# Patient Record
Sex: MALE | Race: WHITE | Employment: OTHER | URBAN - METROPOLITAN AREA
[De-identification: names, ages, dates, MRNs, and addresses within clinical notes are randomized per-mention and may not be internally consistent; named-entity substitution may affect disease eponyms.]

---

## 2017-01-03 ENCOUNTER — NURSING HOME VISIT (OUTPATIENT)
Dept: GERIATRICS | Facility: CLINIC | Age: 82
End: 2017-01-03
Payer: COMMERCIAL

## 2017-01-03 ENCOUNTER — NURSING HOME VISIT (OUTPATIENT)
Dept: GERIATRICS | Facility: CLINIC | Age: 82
End: 2017-01-03

## 2017-01-03 VITALS
TEMPERATURE: 97.4 F | OXYGEN SATURATION: 94 % | HEART RATE: 70 BPM | WEIGHT: 189.2 LBS | RESPIRATION RATE: 16 BRPM | SYSTOLIC BLOOD PRESSURE: 142 MMHG | DIASTOLIC BLOOD PRESSURE: 71 MMHG

## 2017-01-03 VITALS
SYSTOLIC BLOOD PRESSURE: 142 MMHG | OXYGEN SATURATION: 94 % | DIASTOLIC BLOOD PRESSURE: 71 MMHG | RESPIRATION RATE: 16 BRPM | HEART RATE: 70 BPM | WEIGHT: 189.2 LBS | TEMPERATURE: 97.4 F

## 2017-01-03 DIAGNOSIS — Z53.9 ERRONEOUS ENCOUNTER--DISREGARD: Primary | ICD-10-CM

## 2017-01-03 DIAGNOSIS — S06.5XAA SUBDURAL HEMATOMA (H): Primary | ICD-10-CM

## 2017-01-03 DIAGNOSIS — N40.0 BENIGN PROSTATIC HYPERPLASIA WITHOUT LOWER URINARY TRACT SYMPTOMS, UNSPECIFIED MORPHOLOGY: ICD-10-CM

## 2017-01-03 DIAGNOSIS — M1A.09X0 CHRONIC GOUT OF MULTIPLE SITES, UNSPECIFIED CAUSE: ICD-10-CM

## 2017-01-03 DIAGNOSIS — N30.00 ACUTE CYSTITIS WITHOUT HEMATURIA: ICD-10-CM

## 2017-01-03 DIAGNOSIS — R53.81 PHYSICAL DECONDITIONING: ICD-10-CM

## 2017-01-03 DIAGNOSIS — K12.0 CANKER SORE: ICD-10-CM

## 2017-01-03 DIAGNOSIS — E11.9 TYPE 2 DIABETES MELLITUS WITHOUT COMPLICATION, WITHOUT LONG-TERM CURRENT USE OF INSULIN (H): ICD-10-CM

## 2017-01-03 PROCEDURE — 99310 SBSQ NF CARE HIGH MDM 45: CPT | Performed by: NURSE PRACTITIONER

## 2017-01-03 PROCEDURE — 99207 ZZC CDG-CORRECTLY CODED, REVIEWED AND AGREE: CPT | Performed by: NURSE PRACTITIONER

## 2017-01-03 RX ORDER — LEVETIRACETAM 750 MG/1
750 TABLET ORAL 2 TIMES DAILY
COMMUNITY
End: 2017-09-11

## 2017-01-03 RX ORDER — TAMSULOSIN HYDROCHLORIDE 0.4 MG/1
0.4 CAPSULE ORAL DAILY
COMMUNITY

## 2017-01-03 NOTE — PROGRESS NOTES
"[facility]  PRIMARY CARE PROVIDER AND CLINIC:  Confirmed, No Pcp None  Chief Complaint   Patient presents with     Hospital F/U       HPI:    Farhad Roa is a 89 year old  (8/28/1923),[facility] .  Hospital stay 12/29/16 through 12/31/16.  Admitted to this facility for  rehab, medical management and nursing care. Current issues are:        Per Hospital Course:     \"89 year old male with history of DMII, OA, HTN, and hypothyroidism who presents from home with a fall and evidence of subdural hematoma on imaging.    # Acute subdural Hematoma    #Chronic weakness with recurrent falls  Patient presented from home with family for a fall and evidence of subdural hematoma on CT. Neuro exam was normal/stable throughout admission. Neurosurgery was consulted and recommended serial imaging with medical management; repeat imaging showed a stable subdural hematoma. The patient's fall was felt to be mechanical given his description of the event, history of weakness with falls, and lack of other symptoms. Neurosurgery recommended a short course of levetiracetam prior for 1 week as seizure prophylaxis. The patient was evaluated by PT/OT who recommended he be sent to a short term rehab facility prior to returning to assisted living facility.   -Continue levetiracetam for 4 days then discontinue if patient remains stable  -Neurosurgery clinic follow up in 1 month with repeat CT    #Metformin intolerance  During admission the patient's daughter noted the patient has mild stomach cramping after taking metformin on a daily basis. Recent A1c elevated at 9.2. Patient to follow up with PCP for adjustment of diabetes management following rehab.\"      ---------------------------    Today patient is found in room. Alert, lying in bed. Muted affect but does answer questions approp. Slowed responses. Denies pain. Denies n/v or headache. Denies SOB, " chest pain. States appetite is okay. Nsg reports diarrhea x 2 today- of note Metformin just increased (tapering up 2/2 some concern about GI issues). Patient denies abd pain. Reports canker sore right low lip and some associated discomfort. States did meet with therapy today. VS reviewed and stable.     CODE STATUS/ADVANCE DIRECTIVES DISCUSSION:   Reviewed.  Patient's living condition: lives alone    ALLERGIES:Lisinopril and Penicillins  PAST MEDICAL HISTORY:  has no past medical history on file.  PAST SURGICAL HISTORY:  has no past surgical history on file.  FAMILY HISTORY: family history is not on file.  SOCIAL HISTORY:      Post Discharge Medication Reconciliation Status: discharge medications reconciled and changed, per note/orders (see AVS).  Current Outpatient Prescriptions   Medication Sig Dispense Refill     LEVOTHYROXINE SODIUM PO Take 0.025 mg by mouth daily       CIPROFLOXACIN HCL PO Take 500 mg by mouth 2 times daily 1/1/17-1/6/17       tamsulosin (FLOMAX) 0.4 MG capsule Take by mouth daily       ALLOPURINOL PO Take 100 mg by mouth daily       levETIRAcetam (KEPPRA) 750 MG tablet Take 750 mg by mouth 2 times daily 1/1/17-1/5/17       FINASTERIDE PO Take 5 mg by mouth daily       METFORMIN HCL PO Take 500 mg by mouth Give 500 mg by mouth one time a day for DM for 7 Days Slowly titrate up to prevent diarrhea/abd cramping. THEN Give 500 mg by mouth two times a day for DM for 7 Days Slowly titrate up to prevent diarrhea/abd cramping. THEN Give 1000 mg by mouth three times a day for DM for 7 Days Slowly titrate up to prevent diarrhea/abd cramping. THEN Give 1000 mg by mouth two times a day for DM Slowly titrate up to prevent diarrhea/abd cramping.         ROS:  4 point ROS including Respiratory, CV, GI and , other than that noted in the HPI,  is negative    Exam:  /71 mmHg  Pulse 70  Temp(Src) 97.4  F (36.3  C)  Resp 16  Wt 189 lb 3.2 oz (85.821 kg)  SpO2 94%    GENERAL APPEARANCE: Alert, in no  distress   ENT: Mouth and posterior oropharynx normal, moist mucous membranes - Small pale apthous ulcer noted left side lower inner lip  EYES: EOM, conjunctivae, lids, pupils and irises normal   NECK: No adenopathy,masses or thyromegaly   RESP: respiratory effort and palpation of chest normal, Lungs clear to auscultation  CV: Palpation and auscultation of heart done , regular rate and rhythm, no murmur, rub, or gallop, peripheral edema - none noted  ABDOMEN: normal bowel sounds, soft, nontender, no hepatosplenomegaly or other masses   : palpation of bladder WNL   M/S: Gait and station normal   Digits and nails normal - OLIVARES  SKIN: Inspection of skin and subcutaneous tissue baseline, Palpation of skin and subcutaneous tissue baseline,  NEURO: Cranial nerves 2-12 are normal tested and grossly at patient's baseline, Examination of sensation by touch normal   PSYCH: oriented X 3, affect and mood normal               Lab/Diagnostic data:  No current labs on file.      ASSESSMENT/PLAN:  Subdural hematoma (H)  Frequent falls  - serial [facility] inpatient - stable  - follow neuros, continue on short Keppra course for Sz proph  - PT/OT, fall prec  - ST eval/tx cog-ling  - f/w neuro 1 month    Acute cystitis without hematuria  - completing Cipro on 6th  - follow clinically    Chronic gout of multiple sites, unspecified cause   - no active flare  - continue on allopurinol    Type 2 diabetes mellitus without complication, without long-term current use of insulin (H)  - HGB A1c 9.2  - some concern ? GI side effects so slowly titrating upward  - continue on metformin and observe/follow clinically  - observe BGL    Benign prostatic hyperplasia without lower urinary tract symptoms, unspecified morphology  - continue on Flomax  - follow voiding pattern    Canker sore  - add ambesol prn  - follow intake/weights/pain    Physical deconditioning  - 2/2 above  - was living alone- ? Need more supportive environment at d/c  - PT/OT  -  ongoing discharge planning, SW follow and care conferences per unit protocol        Information reviewed:  Medications, vital signs, orders, nursing notes, problem list, hospital information. Total time spent with patient visit was 45 min including patient visit and review of past records. Greater than 50% of total time spent with counseling and coordinating care.    Electronically signed by:  RONY Cowan CNP

## 2017-01-04 ENCOUNTER — NURSING HOME VISIT (OUTPATIENT)
Dept: GERIATRICS | Facility: CLINIC | Age: 82
End: 2017-01-04
Payer: COMMERCIAL

## 2017-01-04 DIAGNOSIS — M10.00 IDIOPATHIC GOUT, UNSPECIFIED CHRONICITY, UNSPECIFIED SITE: ICD-10-CM

## 2017-01-04 DIAGNOSIS — S06.5XAA SUBDURAL HEMATOMA (H): ICD-10-CM

## 2017-01-04 DIAGNOSIS — R53.81 PHYSICAL DECONDITIONING: ICD-10-CM

## 2017-01-04 DIAGNOSIS — E03.8 OTHER SPECIFIED HYPOTHYROIDISM: Primary | ICD-10-CM

## 2017-01-04 DIAGNOSIS — N40.0 BENIGN NON-NODULAR PROSTATIC HYPERPLASIA WITHOUT LOWER URINARY TRACT SYMPTOMS: ICD-10-CM

## 2017-01-04 PROCEDURE — 99207 ZZC CDG-CORRECTLY CODED, REVIEWED AND AGREE: CPT | Performed by: INTERNAL MEDICINE

## 2017-01-04 PROCEDURE — 99305 1ST NF CARE MODERATE MDM 35: CPT | Performed by: INTERNAL MEDICINE

## 2017-01-04 NOTE — PROGRESS NOTES
PRIMARY CARE PROVIDER AND CLINIC RESPONSIBLE:  Confirmed, No Pcp, None        ADMISSION HISTORY AND PHYSICAL EXAMINATION     Chief Complaint   Patient presents with     Hospital F/U         HISTORY OF PRESENT ILLNESS:  89 year old male, (8/28/1923), [facility] for [facility] of medical care and rehab.    Pt admitted to the [facility] 12/29 to 12/31 for fall and SDH. Hx of DM, HTN, OA.    Pt denies any GREEN/visual changes/focal weakness/chest pain.     Please see Dawna Pham's admit noted dated 1/3 for details of admission, past medical history, family history, allergies, medication list, social history and other details pertinent with this admission. Hospital admission and dc summary reviewed.      No past medical history on file.    No past surgical history on file.    Current Outpatient Prescriptions   Medication Sig     LEVOTHYROXINE SODIUM PO Take 0.025 mg by mouth daily     CIPROFLOXACIN HCL PO Take 500 mg by mouth 2 times daily 1/1/17-1/6/17     tamsulosin (FLOMAX) 0.4 MG capsule Take by mouth daily     ALLOPURINOL PO Take 100 mg by mouth daily     levETIRAcetam (KEPPRA) 750 MG tablet Take 750 mg by mouth 2 times daily 1/1/17-1/5/17     FINASTERIDE PO Take 5 mg by mouth daily     METFORMIN HCL PO Take 500 mg by mouth Give 500 mg by mouth one time a day for DM for 7 Days Slowly titrate up to prevent diarrhea/abd cramping. THEN Give 500 mg by mouth two times a day for DM for 7 Days Slowly titrate up to prevent diarrhea/abd cramping. THEN Give 1000 mg by mouth three times a day for DM for 7 Days Slowly titrate up to prevent diarrhea/abd cramping. THEN Give 1000 mg by mouth two times a day for DM Slowly titrate up to prevent diarrhea/abd cramping.     No current facility-administered medications for this visit.       Allergies   Allergen Reactions     Lisinopril Unknown     Penicillins Unknown       Social History     Social History     Marital Status:      Spouse Name: N/A     Number of  Children: N/A     Years of Education: N/A     Occupational History     Not on file.     Social History Main Topics     Smoking status: Not on file     Smokeless tobacco: Not on file     Alcohol Use: Not on file     Drug Use: Not on file     Sexual Activity: Not on file     Other Topics Concern     Not on file     Social History Narrative     No narrative on file          Information reviewed:  Medications, vital signs, orders, nursing notes, problem list, hospital information.     ROS: All 10 point review of system completed, those pertinent positive, please see H&P, the remaining ROS is negative.    /71 mmHg  Pulse 70  Temp(Src) 97.4  F (36.3  C)  Resp 16  Wt 189 lb 3.2 oz (85.821 kg)  SpO2 94%    PHYSICAL EXAMINATION:   GENERAL:  No acute distress. Sitting on bed, slightly [place].  SKIN:  Dry and warm.  There is no rash, lesions, ulcers or juandice at area of skin examined.  HEENT:  Head without trauma.  Pupils round, reactive. Exam of conjunctiva and lids are normal. Sclera without icterus. There is no oral thrush.  NECK:  Supple.  There is no cervical adenopathy, no thyromegaly. No jugular venous distension.  CHEST: No reproducible chest tenderness.   LUNGS:  Normal respiratory effort. Lungs are Clear on ascultation.  HEART:  Regular rate and rhythm.  No murmur, gallops or rubs auscultated.  ABDOMEN:  Soft, bowel sounds positive.  There is no tenderness or guarding.   EXTREMITIES: No edema. Normal range of motion. No calf swelling or tenderness.  NEUROLOGIC:  Alert and follows commands, no focal neurological deficit appreciated.    Lab/Diagnostic data:  Reviewed      ASSESSMENT / PLAN:     Subdural hematoma (H)  - [facility] imaging showed stability.  - On keppra for seizure ppx.  - Fall precautions.    Other specified hypothyroidism  - On synthroid.    Idiopathic gout, unspecified chronicity, unspecified site  - On allopurinol.    Benign non-nodular prostatic hyperplasia with lower urinary tract symptoms  -  On finasteride and flomax.  - On cipro for UTI.    Physical deconditioning  -Plan: PT/OT, fall precautions. Care conference with patient and family for the progress of rehab and disposition issues will be discussed as planned. Rehab evaluation and other evaluations including CPT are at rehab logs, to be reviewed separately.  Fall risk assessment as well as cognitive evaluation will be formed during rehab stay if indicated.    Type 2 DM without long term insulin use.  - On metformin.    Other problems with same care. Primary care doctor and other specialists to address those chronic problems in next clinic appointment to be scheduled upon discharge from the TCU.    Total time spent with patient visit was 40 min including patient visit, review of past records, 1/2 time on patients counseling and coordinating care.        Calvin Corbin MD

## 2017-01-05 PROBLEM — K12.0 CANKER SORE: Status: ACTIVE | Noted: 2017-01-05

## 2017-01-05 PROBLEM — R30.0 DYSURIA: Status: RESOLVED | Noted: 2017-01-05 | Resolved: 2017-01-05

## 2017-01-05 PROBLEM — E11.9 TYPE 2 DIABETES MELLITUS WITHOUT COMPLICATION (H): Status: ACTIVE | Noted: 2017-01-05

## 2017-01-05 PROBLEM — M1A.09X0 CHRONIC GOUT OF MULTIPLE SITES: Status: ACTIVE | Noted: 2017-01-05

## 2017-01-05 PROBLEM — N40.0 BENIGN PROSTATIC HYPERPLASIA WITHOUT LOWER URINARY TRACT SYMPTOMS: Status: ACTIVE | Noted: 2017-01-05

## 2017-01-05 PROBLEM — N30.00 ACUTE CYSTITIS WITHOUT HEMATURIA: Status: ACTIVE | Noted: 2017-01-05

## 2017-01-05 PROBLEM — R30.0 DYSURIA: Status: ACTIVE | Noted: 2017-01-05

## 2017-01-05 PROBLEM — S06.5XAA SUBDURAL HEMATOMA (H): Status: ACTIVE | Noted: 2017-01-05

## 2017-01-07 ENCOUNTER — TELEPHONE (OUTPATIENT)
Dept: GERIATRICS | Facility: CLINIC | Age: 82
End: 2017-01-07

## 2017-01-07 NOTE — TELEPHONE ENCOUNTER
Nursing report that last night patient had an episode of slurred speech and confusion, which is resolved and neuro's intact today, vitals stable  Continue to monitor Neuro's and update for any further changes

## 2017-01-08 ENCOUNTER — APPOINTMENT (OUTPATIENT)
Dept: CT IMAGING | Facility: CLINIC | Age: 82
End: 2017-01-08
Attending: EMERGENCY MEDICINE
Payer: MEDICARE

## 2017-01-08 ENCOUNTER — HOSPITAL ENCOUNTER (EMERGENCY)
Facility: CLINIC | Age: 82
Discharge: ANOTHER HOSPITAL | End: 2017-01-09
Attending: EMERGENCY MEDICINE | Admitting: EMERGENCY MEDICINE
Payer: MEDICARE

## 2017-01-08 DIAGNOSIS — S06.5XAA SUBDURAL HEMATOMA (H): ICD-10-CM

## 2017-01-08 DIAGNOSIS — E87.1 HYPONATREMIA: ICD-10-CM

## 2017-01-08 DIAGNOSIS — N39.0 UTI (URINARY TRACT INFECTION) WITH PYURIA: ICD-10-CM

## 2017-01-08 DIAGNOSIS — R41.82 ALTERED MENTAL STATUS, UNSPECIFIED ALTERED MENTAL STATUS TYPE: ICD-10-CM

## 2017-01-08 LAB
BASOPHILS # BLD AUTO: 0.1 10E9/L (ref 0–0.2)
BASOPHILS NFR BLD AUTO: 0.4 %
DIFFERENTIAL METHOD BLD: ABNORMAL
EOSINOPHIL # BLD AUTO: 0.1 10E9/L (ref 0–0.7)
EOSINOPHIL NFR BLD AUTO: 0.8 %
ERYTHROCYTE [DISTWIDTH] IN BLOOD BY AUTOMATED COUNT: 14.4 % (ref 10–15)
HCT VFR BLD AUTO: 43.1 % (ref 40–53)
HGB BLD-MCNC: 14.5 G/DL (ref 13.3–17.7)
IMM GRANULOCYTES # BLD: 0.2 10E9/L (ref 0–0.4)
IMM GRANULOCYTES NFR BLD: 1.5 %
INR PPP: 1.12 (ref 0.86–1.14)
LYMPHOCYTES # BLD AUTO: 1.2 10E9/L (ref 0.8–5.3)
LYMPHOCYTES NFR BLD AUTO: 10.2 %
MCH RBC QN AUTO: 33.3 PG (ref 26.5–33)
MCHC RBC AUTO-ENTMCNC: 33.6 G/DL (ref 31.5–36.5)
MCV RBC AUTO: 99 FL (ref 78–100)
MONOCYTES # BLD AUTO: 0.5 10E9/L (ref 0–1.3)
MONOCYTES NFR BLD AUTO: 4.2 %
NEUTROPHILS # BLD AUTO: 9.8 10E9/L (ref 1.6–8.3)
NEUTROPHILS NFR BLD AUTO: 82.9 %
NRBC # BLD AUTO: 0 10*3/UL
NRBC BLD AUTO-RTO: 0 /100
PLATELET # BLD AUTO: 256 10E9/L (ref 150–450)
RBC # BLD AUTO: 4.35 10E12/L (ref 4.4–5.9)
WBC # BLD AUTO: 11.9 10E9/L (ref 4–11)

## 2017-01-08 PROCEDURE — 70450 CT HEAD/BRAIN W/O DYE: CPT

## 2017-01-08 PROCEDURE — 80177 DRUG SCRN QUAN LEVETIRACETAM: CPT | Performed by: EMERGENCY MEDICINE

## 2017-01-08 PROCEDURE — 85025 COMPLETE CBC W/AUTO DIFF WBC: CPT | Performed by: EMERGENCY MEDICINE

## 2017-01-08 PROCEDURE — 80053 COMPREHEN METABOLIC PANEL: CPT | Performed by: EMERGENCY MEDICINE

## 2017-01-08 PROCEDURE — 86900 BLOOD TYPING SEROLOGIC ABO: CPT | Performed by: EMERGENCY MEDICINE

## 2017-01-08 PROCEDURE — 99285 EMERGENCY DEPT VISIT HI MDM: CPT | Mod: 25

## 2017-01-08 PROCEDURE — 85610 PROTHROMBIN TIME: CPT | Performed by: EMERGENCY MEDICINE

## 2017-01-08 PROCEDURE — 86850 RBC ANTIBODY SCREEN: CPT | Performed by: EMERGENCY MEDICINE

## 2017-01-08 PROCEDURE — 86901 BLOOD TYPING SEROLOGIC RH(D): CPT | Performed by: EMERGENCY MEDICINE

## 2017-01-08 PROCEDURE — 93005 ELECTROCARDIOGRAM TRACING: CPT

## 2017-01-08 RX ORDER — LIDOCAINE 40 MG/G
CREAM TOPICAL
Status: DISCONTINUED | OUTPATIENT
Start: 2017-01-08 | End: 2017-01-09 | Stop reason: HOSPADM

## 2017-01-09 VITALS
OXYGEN SATURATION: 94 % | SYSTOLIC BLOOD PRESSURE: 129 MMHG | TEMPERATURE: 98.4 F | RESPIRATION RATE: 16 BRPM | DIASTOLIC BLOOD PRESSURE: 64 MMHG

## 2017-01-09 LAB
ABO + RH BLD: NORMAL
ABO + RH BLD: NORMAL
ALBUMIN SERPL-MCNC: 3.6 G/DL (ref 3.4–5)
ALBUMIN UR-MCNC: 30 MG/DL
ALP SERPL-CCNC: 458 U/L (ref 40–150)
ALT SERPL W P-5'-P-CCNC: 28 U/L (ref 0–70)
ANION GAP SERPL CALCULATED.3IONS-SCNC: 7 MMOL/L (ref 3–14)
APPEARANCE UR: CLEAR
AST SERPL W P-5'-P-CCNC: 27 U/L (ref 0–45)
BILIRUB SERPL-MCNC: 1 MG/DL (ref 0.2–1.3)
BILIRUB UR QL STRIP: NEGATIVE
BLD GP AB SCN SERPL QL: NORMAL
BLOOD BANK CMNT PATIENT-IMP: NORMAL
BUN SERPL-MCNC: 18 MG/DL (ref 7–30)
CALCIUM SERPL-MCNC: 8.4 MG/DL (ref 8.5–10.1)
CHLORIDE SERPL-SCNC: 94 MMOL/L (ref 94–109)
CO2 SERPL-SCNC: 29 MMOL/L (ref 20–32)
COLOR UR AUTO: YELLOW
CREAT SERPL-MCNC: 1.14 MG/DL (ref 0.66–1.25)
GFR SERPL CREATININE-BSD FRML MDRD: 60 ML/MIN/1.7M2
GLUCOSE SERPL-MCNC: 235 MG/DL (ref 70–99)
GLUCOSE UR STRIP-MCNC: NEGATIVE MG/DL
HGB UR QL STRIP: ABNORMAL
INTERPRETATION ECG - MUSE: NORMAL
KETONES UR STRIP-MCNC: NEGATIVE MG/DL
LEUKOCYTE ESTERASE UR QL STRIP: ABNORMAL
MUCOUS THREADS #/AREA URNS LPF: PRESENT /LPF
NITRATE UR QL: NEGATIVE
PH UR STRIP: 7.5 PH (ref 5–7)
POTASSIUM SERPL-SCNC: 5 MMOL/L (ref 3.4–5.3)
PROT SERPL-MCNC: 7.9 G/DL (ref 6.8–8.8)
RADIOLOGIST FLAGS: ABNORMAL
RBC #/AREA URNS AUTO: >182 /HPF (ref 0–2)
SODIUM SERPL-SCNC: 130 MMOL/L (ref 133–144)
SP GR UR STRIP: 1.01 (ref 1–1.03)
SPECIMEN EXP DATE BLD: NORMAL
URN SPEC COLLECT METH UR: ABNORMAL
UROBILINOGEN UR STRIP-MCNC: NORMAL MG/DL (ref 0–2)
WBC #/AREA URNS AUTO: 68 /HPF (ref 0–2)

## 2017-01-09 PROCEDURE — 81001 URINALYSIS AUTO W/SCOPE: CPT | Performed by: EMERGENCY MEDICINE

## 2017-01-09 PROCEDURE — 25000125 ZZHC RX 250

## 2017-01-09 PROCEDURE — 87086 URINE CULTURE/COLONY COUNT: CPT | Performed by: EMERGENCY MEDICINE

## 2017-01-09 PROCEDURE — 25000125 ZZHC RX 250: Performed by: EMERGENCY MEDICINE

## 2017-01-09 PROCEDURE — 96376 TX/PRO/DX INJ SAME DRUG ADON: CPT

## 2017-01-09 PROCEDURE — 96375 TX/PRO/DX INJ NEW DRUG ADDON: CPT

## 2017-01-09 PROCEDURE — 96365 THER/PROPH/DIAG IV INF INIT: CPT

## 2017-01-09 RX ORDER — LORAZEPAM 2 MG/ML
1 INJECTION INTRAMUSCULAR ONCE
Status: COMPLETED | OUTPATIENT
Start: 2017-01-09 | End: 2017-01-09

## 2017-01-09 RX ORDER — LORAZEPAM 2 MG/ML
1 INJECTION INTRAMUSCULAR ONCE
Status: DISCONTINUED | OUTPATIENT
Start: 2017-01-09 | End: 2017-01-09

## 2017-01-09 RX ORDER — LORAZEPAM 2 MG/ML
INJECTION INTRAMUSCULAR
Status: COMPLETED
Start: 2017-01-09 | End: 2017-01-09

## 2017-01-09 RX ORDER — CEFTRIAXONE 1 G/1
1 INJECTION, POWDER, FOR SOLUTION INTRAMUSCULAR; INTRAVENOUS ONCE
Status: COMPLETED | OUTPATIENT
Start: 2017-01-09 | End: 2017-01-09

## 2017-01-09 RX ORDER — HALOPERIDOL 5 MG/ML
5 INJECTION INTRAMUSCULAR ONCE
Status: DISCONTINUED | OUTPATIENT
Start: 2017-01-09 | End: 2017-01-09

## 2017-01-09 RX ORDER — HALOPERIDOL 5 MG/ML
5 INJECTION INTRAMUSCULAR ONCE
Status: COMPLETED | OUTPATIENT
Start: 2017-01-09 | End: 2017-01-09

## 2017-01-09 RX ORDER — LORAZEPAM 2 MG/ML
INJECTION INTRAMUSCULAR
Status: DISCONTINUED
Start: 2017-01-09 | End: 2017-01-09 | Stop reason: HOSPADM

## 2017-01-09 RX ADMIN — LORAZEPAM 1 MG: 2 INJECTION INTRAMUSCULAR; INTRAVENOUS at 02:49

## 2017-01-09 RX ADMIN — HALOPERIDOL LACTATE 5 MG: 5 INJECTION, SOLUTION INTRAMUSCULAR at 01:15

## 2017-01-09 RX ADMIN — LORAZEPAM 1 MG: 2 INJECTION INTRAMUSCULAR at 00:40

## 2017-01-09 RX ADMIN — CEFTRIAXONE 1 G: 1 INJECTION, POWDER, FOR SOLUTION INTRAMUSCULAR; INTRAVENOUS at 01:46

## 2017-01-09 RX ADMIN — LORAZEPAM 1 MG: 2 INJECTION INTRAMUSCULAR; INTRAVENOUS at 00:40

## 2017-01-09 NOTE — ED NOTES
Pt continues to be agitated at times. Family remains at bedside. [facility] Report was given to JEAN Atwood and awaiting transportation. Will cont to monitor.

## 2017-01-09 NOTE — ED NOTES
Pt brought by EMS. Per EMS pt fell 45 minutes prior to their arrival. Bruising to right side of head. Pt is currently non-verbal but baseline he normally speaks. Eyes non-reactant to light. Glucose 193. Pt ABCs intact.

## 2017-01-09 NOTE — ED PROVIDER NOTES
"  History     Chief Complaint:  Fall     History is limited secondary to the patient being non-verbal, some history is provided by the patient's family.  ISMAEL Roa is a 89 year old male with a complex medical history significant for type II diabetes and recent subdural hematoma on 12/29/2016, non-anticoagulated, who presents to the emergency department today for evaluation of a fall. The patient's daughter reports that since the patient had his subdural last Thursday [facility], was rechecked often and did not have any evidence of stroke or increased bleeding on the brain. He was transferred to [facility] one week ago and has been cared for there. The patient's son reports that today the patient was non-verbal aside from \"yes\" or \"no,\" which is novel. He also notes that this is the patient's worse day and that this morning he was holding the right side of his head and seemed to be in discomfort. family also notes possible right sided facial droop this morning, but are unsure how long this has been present. This evening staff report the patient sustained another fall from his bed, but this was unwitnessed and they are unsure if he hit his head; no apparent lose of consciousness. Family affirms that the patient is DNR/DNI.     Allergies:  Lisinopril  Penicillins      Medications:    Levothyroxine  Ciprofloxacin - finished 1/6  Flomax  Allopurinol  Keppra  Finasteride  Metformin      Past Medical History:    Type II diabetes  Seizures  Hypothyroid  SDH    Past Surgical History:    Unknown surgical history      Family History:    Unknown family history    Social History:  The patient was accompanied to the ED by his family.    Review of Systems   Unable to perform ROS: Patient nonverbal     Physical Exam   Vitals:  Patient Vitals for the past 24 hrs:   BP Temp Temp src Heart Rate Resp SpO2   01/09/17 0230 129/64 mmHg - - 91 - 94 %   01/09/17 0200 (!) 163/94 mmHg - - 105 - 96 % "   01/09/17 0132 - - - 108 - 94 %   01/09/17 0130 147/85 mmHg - - 108 - 98 %   01/09/17 0115 - - - 116 - 96 %   01/09/17 0100 (!) 102/92 mmHg - - - - -   01/09/17 0000 136/78 mmHg - - 87 - 95 %   01/08/17 2332 - - - 87 - -   01/08/17 2327 (!) 153/97 mmHg 98.4  F (36.9  C) Oral 84 16 -   01/08/17 2325 (!) 153/97 mmHg - - - - -   01/08/17 2323 - - - - - 99 %     Physical Exam   Constitutional: He appears well-developed and well-nourished.   Non-verbal, not following commands   HENT:   Right Ear: External ear normal.   Left Ear: External ear normal.   Mouth/Throat: Oropharynx is clear and moist. No oropharyngeal exudate.   TM's clear bilaterally   Eyes: Conjunctivae and EOM are normal. Pupils are equal, round, and reactive to light. No scleral icterus.   Neck: Normal range of motion. Neck supple.   Cardiovascular: Normal rate, regular rhythm, normal heart sounds and intact distal pulses.  Exam reveals no gallop and no friction rub.    No murmur heard.  Pulmonary/Chest: Effort normal and breath sounds normal. No respiratory distress. He has no wheezes. He has no rales.   Abdominal: Soft. Bowel sounds are normal. He exhibits no distension and no mass. There is no tenderness.   Musculoskeletal: Normal range of motion. He exhibits no edema.   Neurological: He is alert.   Able to squeeze left hand normally.  Unable to squeeze right hand.  Moving BLE lying in bed but unable to hold legs up off the bed.  Not able to cooperated with rest of the neuro exam.   Skin: Skin is warm and dry. No rash noted.   Psychiatric:   Flat affect     Emergency Department Course     ECG:  ECG taken at 2353, ECG read at 0000  Sinus rhythm with premature atrial complexes  Moderate voltage criteria for LVH, may be normal variant  Inferior infarct, age undetermined  Abnormal ECG  Rate 87 bpm. VT interval 172. QRS duration 92. QT/QTc 394/474. P-R-T axes 31 -23 17.    Imaging:  Radiology findings were communicated with the patient's family who voiced  understanding of the findings.    Head CT w/o contrast:  1. 0.8 cm thick isoattenuating left subdural fluid collection, likely  a hematoma, resulting in minimal midline shift from left to right of  approximately 0.2 cm.  2. No other evidence of acute intracranial abnormality.  3. Nonspecific diffuse patchy lucencies throughout the calvarium.  Reading per radiology    Laboratory:  Laboratory findings were communicated with the patient's family who voiced understanding of the findings.    CBC: WBC: 11.9 (H) o/w WNL. (HGB 14.5, )   CMP: NA: 130 (L), Glucose: 235 (H), GFR: 60 (L), Calcium: 8.4 (L), Alkphos: 458 (H) (Creatinine 1.14)  INR (Collected at 2330): 1.12  ABO/Rh type and screen: A+  UA: Urine Blood: Large (A), pH: 7.5 (H), Protein Albumin: 30 (A), Leukocyte Esterase: Moderate (A)< WBC/HPF: 68 (H), RBC/HPF: >182 (H), Mucous Present (A)    Urine Culture Aerobic Bacteria: Pending  Levetiracetam level: Pending    Interventions:  0040 Ativan 1 mg IV   0115 Haldol 5 mg IV  0146 Rocephin 1 g IV  0249 Ativan 1 mg IV    Emergency Department Course:  Nursing notes and vitals reviewed.  I performed an exam of the patient as documented above.   IV was inserted and blood was drawn for laboratory testing, results above.  The patient was sent for a Head CT w/o contrast while in the emergency department, results above.   At 0032 the patient was rechecked and family was updated on the results of the patient's laboratory and imaging studies. The patient was noted to becoming agitated at this time and was given Ativan. He was subsequently given Haldol and was restrained due to pulling at his lines.   At 0212 the patient was rechecked and is sleeping.   I discussed the treatment plan with the patient's family. They expressed understanding of this plan and [facility]. I discussed the patient with [facility]ist at the [facility], who will admit the patient to a monitored bed for further evaluation and treatment.  I  personally reviewed the laboratory and imaging results with the patient's family and answered all related questions prior to transfer.    Impression & Plan      Medical Decision Making:  Farhad Roa is a 89 year old male who [facility] altered mental status. Patient is DNR/DNI and [facility] that they monitored without any surgical intervention. Patient is no moving his right side very well and was non-verbal on presentation. He was rushed up to CT scan which showed a 8 millimeter subdural hematoma, however, we did not have any old comparison so I do not know if this is new, increased, or stable. Records from the [facility] were slow to be faxed over. Family desired [facility] to the [facility] for further care. He also still has a UTI which should have been treated. It looked like the nursing home note that antibiotics were done on the 6th of January. He has been afebrile, but it sounds like when the new symptoms started this morning they may have resulted him in falling tonight. I did discuss the findings with Dr. Chirinos of neurosurgery at the [facility] and they will just monitor him for now. I also spoke with admitting Hospitalist at the [facility] and they accepted him to the floor. There may certainly be a metabolic cause for his confusion. He still has a UTI so he is given Rocephin and cultures have been sent. He also has a mild hyponatremia which I am not sure is old or new; I am not able to find this in the [facility] records that we obtained. He did require Ativan and Haldol for agitation and was spitting up and pulling the lines, but was seen using his right upper extremity more; so that is reassuring. Family is informed of need for further care and they do wish to [facility] to the [facility]. They will discuss with the neurosurgical team in the morning in terms of further intervention and care. It did not appear he needs emergent surgery tonight  per discussion with [facility] on call. Pt remains DNR/DNI per family.  At this point the patient is stable and is transferred in stable condition. He did require restraints for a short awhile, but these were removed when he became more calm.     Diagnosis:    ICD-10-CM    1. Subdural hematoma (H) I62.00 ABO/Rh type and screen     UA with Microscopic     Urine Culture Aerobic Bacterial   2. UTI (urinary tract infection) with pyuria N39.0    3. Altered mental status, unspecified altered mental status type R41.82    4. Hyponatremia E87.1        Scribe Disclosure:  Yoan LOPEZ, am serving as a scribe at 11:36 PM on 1/8/2017 to document services personally performed by Juliana Clement MD, based on my observations and the provider's statements to me.    1/8/2017   [facility] EMERGENCY DEPARTMENT        Juliana Clement MD  01/09/17 0451

## 2017-01-09 NOTE — ED NOTES
Pt rolled up onto his left side per self. Grabbing at his penis and appears to be needing to void. Does not understand he can go in a brief or in a urinal. Family states patient can't hear because they don't have his hearing aids with. Encouraged a family member to go get those to see if that would help him rest. Pt continues to be non-verbal.     Talked with MD. Family had requested a mendoza be placed, MD agreed. Mendoza placed at this time, bloody pink tinged urine returns noted. Pt continues to be aggitated and trying to crawl out of bed. MD in room and pt was medicated. Placed medical restraints at this time. Will cont to monitor.

## 2017-01-09 NOTE — ED NOTES
Medical restraints removed by this RN, pt turned and and boosted in bed, per order by MD Urbano, medication given to help reduce pt's agitation.  Seizure pads placed on the bed.

## 2017-01-09 NOTE — ED NOTES
EMS here at this time to transport patient. Daughters left at this time and plan to go to [facility] after they stop at home first.

## 2017-01-10 LAB — LEVETIRACETAM SERPL-MCNC: ABNORMAL UG/ML

## 2017-01-12 LAB
BACTERIA SPEC CULT: NO GROWTH
Lab: NORMAL
MICRO REPORT STATUS: NORMAL
SPECIMEN SOURCE: NORMAL

## 2017-01-23 ENCOUNTER — RECORDS - HEALTHEAST (OUTPATIENT)
Dept: LAB | Facility: CLINIC | Age: 82
End: 2017-01-23

## 2017-01-23 LAB — HBA1C MFR BLD: 8.9 % (ref 4.2–6.1)

## 2017-04-07 ENCOUNTER — RECORDS - HEALTHEAST (OUTPATIENT)
Dept: ADMINISTRATIVE | Facility: OTHER | Age: 82
End: 2017-04-07

## 2017-09-11 ENCOUNTER — ASSISTED LIVING VISIT (OUTPATIENT)
Dept: GERIATRICS | Facility: CLINIC | Age: 82
End: 2017-09-11
Payer: COMMERCIAL

## 2017-09-11 VITALS
TEMPERATURE: 98.1 F | HEART RATE: 72 BPM | RESPIRATION RATE: 20 BRPM | WEIGHT: 187 LBS | SYSTOLIC BLOOD PRESSURE: 120 MMHG | DIASTOLIC BLOOD PRESSURE: 68 MMHG

## 2017-09-11 DIAGNOSIS — E11.21 TYPE 2 DIABETES MELLITUS WITH DIABETIC NEPHROPATHY, WITHOUT LONG-TERM CURRENT USE OF INSULIN (H): ICD-10-CM

## 2017-09-11 DIAGNOSIS — E03.9 ACQUIRED HYPOTHYROIDISM: ICD-10-CM

## 2017-09-11 DIAGNOSIS — S06.5XAA SUBDURAL HEMATOMA (H): ICD-10-CM

## 2017-09-11 DIAGNOSIS — R30.0 DYSURIA: Primary | ICD-10-CM

## 2017-09-11 DIAGNOSIS — L89.152 DECUBITUS ULCER OF SACRAL REGION, STAGE 2 (H): ICD-10-CM

## 2017-09-11 DIAGNOSIS — N39.0 RECURRENT URINARY TRACT INFECTION: ICD-10-CM

## 2017-09-11 DIAGNOSIS — F02.80 DEMENTIA DUE TO MEDICAL CONDITION WITHOUT BEHAVIORAL DISTURBANCE (H): ICD-10-CM

## 2017-09-11 DIAGNOSIS — N40.0 BENIGN PROSTATIC HYPERPLASIA WITHOUT LOWER URINARY TRACT SYMPTOMS: ICD-10-CM

## 2017-09-11 PROBLEM — K12.0 CANKER SORE: Status: RESOLVED | Noted: 2017-01-05 | Resolved: 2017-09-11

## 2017-09-11 PROBLEM — E11.9 TYPE 2 DIABETES MELLITUS WITHOUT COMPLICATION (H): Status: RESOLVED | Noted: 2017-01-05 | Resolved: 2017-09-11

## 2017-09-11 PROCEDURE — 99207 ZZC CDG-CORRECTLY CODED, REVIEWED AND AGREE: CPT | Performed by: INTERNAL MEDICINE

## 2017-09-11 RX ORDER — LANOLIN ALCOHOL/MO/W.PET/CERES
1000 CREAM (GRAM) TOPICAL EVERY OTHER DAY
COMMUNITY
End: 2019-01-01

## 2017-09-11 RX ORDER — AMPICILLIN TRIHYDRATE 500 MG
CAPSULE ORAL 2 TIMES DAILY
COMMUNITY
End: 2019-01-01

## 2017-09-11 NOTE — PROGRESS NOTES
Farhad Roa is a 89 year old male seen September 11, 2017 at [facility] where he has resided for 2 months (admit 5/2017) seen for initial visit.  He is seen in his room, with his daughter Suellen present and she helps with history.     Patient had [facility] admission 12/2016 for UTI and fall at home in which he suffered SDH.   He went to [facility], but had another fall and was readmitted to [facility].     He transferred here from previous AL for more support.     During hospitalization patient had delirium, hallucinations.  He was started on olanzapine, scheduled at .   He has not had any of these symptoms or any agitation recently.     Patient has had DM2 x10-15 years.   Daughter thinks recent A1Cs have been high, 9-10     He has secondary nephropathy  He has had recurrent UTIs.   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   Today he notes burning with urination.   This has persisted despite tx with appropriate abx based on UC&S results.    He is incontinent, wears briefs, but occ will urinate on his own.     Patient has had cognitive decline, now sleeping a good share of the time.   He sits in his chair preferentially, now has some sacral breakdown which he notes is sore       PMH:  DM2, x10+ years   Dementia, late onset  Paget's disease of skull.  Hypothyroidism  H/o SDH, 1/2017  Hearing loss  BPH  H/o urachal cyst  S/p partial cystectomy  Recurrent UTIs       SH:   x7 years.  Previously lived at the [facility]  Daughter Suellen is here today.   Has another daughter Rebeka.       Review Of Systems  Skin: negative   Eyes: impaired vision  Ears/Nose/Throat: hearing loss  Respiratory: No shortness of breath, dyspnea on exertion, cough, or hemoptysis  Cardiovascular: Negative stress ECHO 2013  Gastrointestinal: negative  Genitourinary: as above  Musculoskeletal: ambulatory with FWW, transfers sit to stand without  difficulty.     Neurologic: dementia.    Psychiatric: negative  Hematologic/Lymphatic/Immunologic: negative  Endocrine: diabetes      GENERAL APPEARANCE: alert and no distress  /68  Pulse 72  Temp 98.1  F (36.7  C)  Resp 20  Wt 187 lb (84.8 kg)   HEENT: normocephalic, no lesion or abnormalities  NECK: no adenopathy, no asymmetry, masses, or scars and thyroid normal to palpation  RESP: lungs clear to auscultation - no rales, rhonchi or wheezes  CV: regular rate and rhythm, normal S1 S2, 2/6 JESSICA  ABDOMEN:  soft, nontender, no HSM or masses and bowel sounds normal  : normal uncircumcised male  Urethral meatus normal in appearance, no lesions.     Sacrum with superficial clean, stage 2 decubitus.     MS: extremities normal- no gross deformities noted, no evidence of inflammation in joints, FROM in all extremities, no ext edema.  SKIN: no suspicious lesions or rashes  NEURO: Normal strength and tone, sensory exam grossly normal, and speech normal  PSYCH: affect okay  LYMPHATICS: No cervical,  or supraclavicular nodes     1/8/17: alk phos 458  Calcium 8.4  GFR 60   Cr 1.14     IMP/PLAN:  (R30.0) Dysuria  (primary encounter diagnosis)  (N39.0) Recurrent urinary tract infection  Comment: complicated  history, persistent symptoms despite appropriate abx.    Plan: difficult problem.   Will recheck UC, consider pyridium for symptoms alone. Continue cranberry tabs  Would need Urologic eval for more definitive tx; however family disinclined to that or aggressive tx or catheter placement.       (N40.0) Benign prostatic hyperplasia without lower urinary tract symptoms  Comment: no current symptoms of frequency or nocturia, remains incontinent   Plan: continue tamsulosin and finasteride.       (I62.00) Subdural hematoma (H)  Comment: s/p falls.     Plan: no current symptoms.       (E11.21) Type 2 diabetes mellitus with diabetic nephropathy, without long-term current use of insulin (H)  Comment: nursing staff reports  high sugars on BGM  Plan: continue metformin bid, check A1C.  Consider addition of glipizide or glimepiride if A1C >8 with goal of decreasing glycosuria,  symptoms, and healing wound.       (E03.9) Acquired hypothyroidism  Comment: on replacement  Plan: check TSH       (F02.80) Dementia due to medical condition without behavioral disturbance  Comment: delirium resolved, no behavioral symptoms currently  Plan: d/c scheduled olanzapine at HS, use prn if symptoms occur.     [facility] for support with meds, meals, cares, activity.     (L89.152) Decubitus ulcer of sacral region, stage 2  Comment: superficial.   Plan: OCCUPATIONAL THERAPY referral for pressure-relieving cushion for the chair he sits in.     Barrier cream, monitor.        Heidy Melton MD         Documentation of Face-to-Face and Certification for Home Health Services     Patient: Farhad Roa   YOB: 1923  MR Number: [id number]  Today's Date: 9/11/2017    I certify that patient: Farhad Roa is under my care and that I, or a nurse practitioner or physician's assistant working with me, had a face-to-face encounter that meets the physician face-to-face encounter requirements with this patient on: September 11, 2017.    This encounter with the patient was in whole, or in part, for the following medical condition, which is the primary reason for home health care: sacral decubitus.    I certify that, based on my findings, the following services are medically necessary home health services: Nursing and Occupational Therapy.    My clinical findings support the need for the above services because: Nurse is needed: To provide caregiver training to assist with: sacral decubitus.. and Occupational Therapy Services are needed to assess and treat cognitive ability and address ADL safety due to impairment in cognition.    Further, I certify that my clinical findings support that this patient is homebound (i.e. absences from home require  considerable and taxing effort and are for medical reasons or [employer] services or infrequently or of short duration when for other reasons) because: Requires assistance of another person or specialized equipment to access medical services because patient: Is prone to wander/get lost without assistance...    Based on the above findings. I certify that this patient is confined to the home and needs intermittent skilled nursing care, physical therapy and/or speech therapy.  The patient is under my care, and I have initiated the establishment of the plan of care.  This patient will be followed by a physician who will periodically review the plan of care.  Physician/Provider to provide follow up care: Tay Mccormack    Responsible Medicare certified P[employer] Physician: Heidy Melton MD   Physician Signature: See electronic signature associated with these discharge orders.  Date: 9/11/2017

## 2017-09-12 ENCOUNTER — TRANSFERRED RECORDS (OUTPATIENT)
Dept: HEALTH INFORMATION MANAGEMENT | Facility: CLINIC | Age: 82
End: 2017-09-12

## 2017-09-12 ENCOUNTER — RECORDS - HEALTHEAST (OUTPATIENT)
Dept: LAB | Facility: CLINIC | Age: 82
End: 2017-09-12

## 2017-09-12 LAB
ANION GAP SERPL CALCULATED.3IONS-SCNC: 8 MMOL/L (ref 5–18)
BUN SERPL-MCNC: 22 MG/DL (ref 8–28)
CALCIUM SERPL-MCNC: 9 MG/DL (ref 8.5–10.5)
CHLORIDE SERPLBLD-SCNC: 103 MMOL/L (ref 98–107)
CO2 SERPL-SCNC: 26 MMOL/L (ref 22–31)
CREAT SERPL-MCNC: 1.52 MG/DL (ref 0.7–1.3)
ERYTHROCYTE [DISTWIDTH] IN BLOOD BY AUTOMATED COUNT: 12.9 % (ref 11–14.5)
GFR SERPL CREATININE-BSD FRML MDRD: 43 ML/MIN/1.73M2
GLUCOSE SERPL-MCNC: 308 MG/DL (ref 70–125)
HBA1C MFR BLD: 9.4 % (ref 4.2–6.1)
HCT VFR BLD AUTO: 39.9 % (ref 40–54)
HEMOGLOBIN: 13.2 G/DL (ref 14–18)
MCH RBC QN AUTO: 34.8 PG (ref 27–34)
MCHC RBC AUTO-ENTMCNC: 33.1 G/DL (ref 32–36)
MCV RBC AUTO: 105 FL (ref 80–100)
PLATELET # BLD AUTO: 256 THOU/UL (ref 140–440)
POTASSIUM SERPL-SCNC: 4.6 MMOL/L (ref 3.5–5)
RBC # BLD AUTO: 3.79 MILL/UL (ref 4.4–6.2)
SODIUM SERPL-SCNC: 137 MMOL/L (ref 136–145)
TSH SERPL-ACNC: 4.85 UIU/ML (ref 0.3–5)
WBC # BLD AUTO: 10.4 THOU/UL (ref 4–11)

## 2017-09-13 LAB — HBA1C MFR BLD: 9.4 % (ref 4.2–6.1)

## 2017-09-14 ENCOUNTER — TELEPHONE (OUTPATIENT)
Dept: GERIATRICS | Facility: CLINIC | Age: 82
End: 2017-09-14

## 2017-09-14 NOTE — TELEPHONE ENCOUNTER
Called by nursing staff at [facility] about lab results.     A1C   9.4    UC + for Klebsiella pneumoniae    IMP: Recurrent UTI, with +UC despite abx  DM2 with suboptimal control     PLAN:   D/c Ceftin  Bactrim DS po bid x10 days    Start glipizide 5 mg po qAM    Follow up next week regarding BS and symptoms.     Heidy Melton MD

## 2017-09-18 ENCOUNTER — ASSISTED LIVING VISIT (OUTPATIENT)
Dept: GERIATRICS | Facility: CLINIC | Age: 82
End: 2017-09-18
Payer: COMMERCIAL

## 2017-09-18 VITALS
RESPIRATION RATE: 18 BRPM | OXYGEN SATURATION: 93 % | SYSTOLIC BLOOD PRESSURE: 126 MMHG | DIASTOLIC BLOOD PRESSURE: 78 MMHG | HEART RATE: 72 BPM

## 2017-09-18 DIAGNOSIS — E11.21 TYPE 2 DIABETES MELLITUS WITH DIABETIC NEPHROPATHY, WITHOUT LONG-TERM CURRENT USE OF INSULIN (H): ICD-10-CM

## 2017-09-18 DIAGNOSIS — F51.01 PRIMARY INSOMNIA: ICD-10-CM

## 2017-09-18 DIAGNOSIS — N39.0 URINARY TRACT INFECTION, SITE UNSPECIFIED: Primary | ICD-10-CM

## 2017-09-18 NOTE — PROGRESS NOTES
[facility]    Chief Complaint   Patient presents with     UTI     Diabetes       HPI:    Farhad Roa is a 89 year old  (8/28/1923), who is being seen today for an episodic care visit at [facility].  HPI information obtained from: facility chart records, facility staff, patient report and [facility] chart review.Today's concern is:  UTI, DM, insomnia.  Has had recurrent UTIs, on prophylaxis of ceftin.  Had recent ongoing dysuria. UA/UC repeated due to ongoing dysuria.  UA/UC +. ceftin dc'd, started on septra 9/14/18. Per resident, dysuria sig. Improved.  For DM had taking metfromin. No recent GLC levels. hgba1c done 9/12/17 9.4%. Glipizide 5 mg po qd started. Per staff has had decreased daytime sleepiness.  Has dementia with memory loss, recent psychosis.  No recent reports of delusions. Hs zyprexa recently dc'd.  Resident reports some recent increased insomnia, however none reported by staff.        ALLERGIES: Ciprofloxacin; Lisinopril; and Penicillins  Past Medical, Surgical, Family and Social History reviewed and updated in [facility].    Current Outpatient Prescriptions   Medication Sig Dispense Refill     GLIPIZIDE PO Take 5 mg by mouth daily       Sulfamethoxazole-Trimethoprim (BACTRIM DS PO) Take 1 tablet by mouth 2 times daily       Cranberry 450 MG CAPS Take by mouth 2 times daily       Psyllium (NATURAL FIBER) 48.57 % POWD Take by mouth daily       Probiotic Product (PROBIOTIC DAILY PO) Take by mouth daily       cyanocobalamin (VITAMIN  B-12) 1000 MCG tablet Take 1,000 mcg by mouth daily       OLANZAPINE PO Take 2.5 mg by mouth At Bedtime AND Q6H PRN       glucose 4 G CHEW chewable tablet Take 4 tablets by mouth every hour as needed for low blood sugar       ACETAMINOPHEN PO Take 650 mg by mouth every 4 hours as needed for pain       LEVOTHYROXINE SODIUM PO Take 0.025 mg by mouth daily       tamsulosin (FLOMAX) 0.4 MG capsule Take 0.4 mg by mouth daily        FINASTERIDE PO Take 5 mg  by mouth daily       METFORMIN HCL PO Take 1,000 mg by mouth 2 times daily        Medications reviewed:  Medications reconciled to facility chart and changes were made to reflect current medications as identified as above med list. Below are the changes that were made:   Medications stopped since last EPIC medication reconciliation:   There are no discontinued medications.    Medications started since last [facility] medication reconciliation:  Orders Placed This Encounter   Medications     GLIPIZIDE PO     Sig: Take 5 mg by mouth daily     Sulfamethoxazole-Trimethoprim (BACTRIM DS PO)     Sig: Take 1 tablet by mouth 2 times daily         REVIEW OF SYSTEMS:  CONSTITUTIONAL:  fatigue, forgetfulness and sleep disturbance, EYES:  glasses or contacts, ENT:  [place], CV:  neg, RESPIRATORY: neg, :  occ dysuria-improved, GI:  neg, NEURO:  neg, PSYCH: neg and MUSCULOSKELETAL: neg. ROS limited due toSTML    Physical Exam:  /78  Pulse 72  Resp 18  SpO2 93%  GENERAL APPEARANCE:  Alert, in no distress, cooperative  ENT:  Mouth and posterior oropharynx normal, moist mucous membranes, [place]  EYES:  EOM, conjunctivae, lids, pupils and irises normal, PERRL, no drainage  NECK:  No adenopathy,masses or thyromegaly, no carotid bruit, FROM  RESP:  respiratory effort and palpation of chest normal, lungs clear to auscultation , no respiratory distress  CV:  Palpation and auscultation of heart done , regular rate and rhythm, no murmur, rub, or gallop, no edema  ABDOMEN:  normal bowel sounds, soft, nontender, no hepatosplenomegaly or other masses, no guarding or rebound  M/S:   Gait and station normal  no diff. standing from seated pos. muscle strength 5/5 all 4 ext., normal tone  NEURO:   Cranial nerves 2-12 are normal tested and grossly at patient's baseline, speech clear, no tremor  PSYCH:  insight and judgement impaired, memory impaired , affect and mood normal    Recent Labs:      CBC RESULTS:   Recent Labs   Lab Test 09/12/17   01/08/17   2330   WBC  10.4  11.9*   RBC  3.79*  4.35*   HGB  13.2*  14.5   HCT  39.9*  43.1   MCV  105*  99   MCH  34.8*  33.3*   MCHC  33.1  33.6   RDW  12.9  14.4   PLT  256  256       Last Basic Metabolic Panel:  Recent Labs   Lab Test 09/12/17 01/08/17   2330   NA  137  130*   POTASSIUM  4.6  5.0   CHLORIDE  103  94   ELISHA  9.0  8.4*   CO2  26  29   BUN  22  18   CR  1.52*  1.14   GLC  308*  235*       TSH   Date Value Ref Range Status   09/12/2017 4.85 0.30 - 5.00 uIU/mL Final       Lab Results   Component Value Date    A1C 9.4 09/12/2017       Assessment/Plan:  Urinary tract infection, site unspecified  Improved dysuria, afebrile  1. Cont. deptra ds for 10 days  2. Once above complete, start septra SS 1 tab qd for prophylaxis   3. Cont. To monitor for dysuria, fever  4. Encourage fluids  5. For unresolved dysuria, other UTI s/s will again discuss with dtr Urology referal    Type 2 diabetes mellitus with diabetic nephropathy, without long-term current use of insulin (H)  hgba1c 9.4%, reccurrent UTIs  1. Cont. Metformin, newly added glipizide  2. Start qd GLC levels alt times, reassess over next week  3. Monitor for changes in po intake  4. hgba1c in next 3-4 mos    Primary insomnia  Recent dc of hs zyprexa. No reports of psychosis, resident reports some insomnia-not reported by staff. Less daytime lethargy per staff, since hs zyprexa dc'd.  1. Cont. Prn zyprexa, ifno use over next few mos, dc  2. Monitor for further reports of insomnia  3. For ongoing s/s consider trazodone  4. Cont. PT, increase daytime activity level      Electronically signed by  RONY Sanchez CNP

## 2017-09-25 ENCOUNTER — ASSISTED LIVING VISIT (OUTPATIENT)
Dept: GERIATRICS | Facility: CLINIC | Age: 82
End: 2017-09-25
Payer: COMMERCIAL

## 2017-09-25 VITALS
HEART RATE: 83 BPM | RESPIRATION RATE: 16 BRPM | OXYGEN SATURATION: 96 % | SYSTOLIC BLOOD PRESSURE: 137 MMHG | DIASTOLIC BLOOD PRESSURE: 81 MMHG

## 2017-09-25 DIAGNOSIS — R30.0 DYSURIA: ICD-10-CM

## 2017-09-25 DIAGNOSIS — E11.21 TYPE 2 DIABETES MELLITUS WITH DIABETIC NEPHROPATHY, WITHOUT LONG-TERM CURRENT USE OF INSULIN (H): Primary | ICD-10-CM

## 2017-09-25 DIAGNOSIS — F51.01 PRIMARY INSOMNIA: ICD-10-CM

## 2017-09-25 NOTE — PROGRESS NOTES
[facility]    Chief Complaint   Patient presents with     Diabetes       HPI:    Farhad Roa is a 89 year old  (8/28/1923), who is being seen today for an episodic care visit at [facility].  HPI information obtained from: facility chart records, facility staff, patient report and [facility] chart review.Today's concern is: DM, dysuria, insomnia. Has had elevated GLC levels, elevated hgba1c. Taking metformin.  Has h/o recurrent UTIs, dysuria. Glipizide added 9/14/17. Since this point. GLC levels mid to upper 200s-sl.improved from previous. Po intake stable. No hypoglycemia. Completed septra course for UTI. Started on septra ss qd for UTI prophylaxis, ongoing dysuria. Dysuria has improved. Also taking flomax, finasteride for BPH.  Hs zyprexa dc'd. No delusions noted. Occ. Reports of insomnia. Gen.  More alert during the day. PT continues.       ALLERGIES: Ciprofloxacin; Lisinopril; and Penicillins  Past Medical, Surgical, Family and Social History reviewed and updated in [facility].    Current Outpatient Prescriptions   Medication Sig Dispense Refill     Sulfamethoxazole-Trimethoprim (SEPTRA PO) Take 1 tablet by mouth daily       GLIPIZIDE PO Take 7.5 mg by mouth every morning (before breakfast)       OLANZapine (ZYPREXA PO) Take 2.5 mg by mouth every 6 hours as needed for agitation       Cranberry 450 MG CAPS Take by mouth 2 times daily       Psyllium (NATURAL FIBER) 48.57 % POWD Take by mouth daily       Probiotic Product (PROBIOTIC DAILY PO) Take by mouth daily       cyanocobalamin (VITAMIN  B-12) 1000 MCG tablet Take 1,000 mcg by mouth daily       glucose 4 G CHEW chewable tablet Take 4 tablets by mouth every hour as needed for low blood sugar       ACETAMINOPHEN PO Take 650 mg by mouth every 4 hours as needed for pain       LEVOTHYROXINE SODIUM PO Take 0.025 mg by mouth daily       tamsulosin (FLOMAX) 0.4 MG capsule Take 0.4 mg by mouth daily        FINASTERIDE PO Take 5 mg by mouth daily        METFORMIN HCL PO Take 1,000 mg by mouth 2 times daily        Medications reviewed:  Medications reconciled to facility chart and changes were made to reflect current medications as identified as above med list. Below are the changes that were made:   Medications stopped since last EPIC medication reconciliation:   There are no discontinued medications.    Medications started since last [facility] medication reconciliation:  Orders Placed This Encounter   Medications     Sulfamethoxazole-Trimethoprim (SEPTRA PO)     Sig: Take 1 tablet by mouth daily         REVIEW OF SYSTEMS:  No chest pain, shortness of breath, fevers, chills, headache, nausea, vomiting, dysuria or bowel abnormalities.  Appetite is  normal.  No pain except occ aches. 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    Physical Exam:  /81  Pulse 83  Resp 16  SpO2 96%  GENERAL APPEARANCE:  Alert, in no distress, cooperative  ENT:  Mouth and posterior oropharynx normal, moist mucous membranes, [place], no rhinitis  EYES:  EOM, conjunctivae, lids, pupils and irises normal, PERRL, no drainage  NECK:  No adenopathy,masses or thyromegaly, no carotid bruit  RESP:  respiratory effort and palpation of chest normal, lungs clear to auscultation , no respiratory distress  CV:  Palpation and auscultation of heart done , regular rate and rhythm, no murmur, rub, or gallop, no edema  ABDOMEN:  normal bowel sounds, soft, nontender, no hepatosplenomegaly or other masses, no guarding or rebound  M/S:   Gait and station normal  muscle strength 5/5 all 4 ext., normal tone  NEURO:   Cranial nerves 2-12 are normal tested and grossly at patient's baseline, speech clear, no tremor  PSYCH:  insight and judgement impaired, memory impaired , affect abnormal -flat    Recent Labs:      CBC RESULTS:   Recent Labs   Lab Test 09/12/17 01/08/17   2330   WBC  10.4  11.9*   RBC  3.79*  4.35*   HGB  13.2*  14.5   HCT  39.9*  43.1   MCV  105*  99   MCH  34.8*  33.3*   MCHC  33.1  33.6   RDW  12.9  14.4    PLT  256  256       Last Basic Metabolic Panel:  Recent Labs   Lab Test 09/12/17 01/08/17   2330   NA  137  130*   POTASSIUM  4.6  5.0   CHLORIDE  103  94   ELISHA  9.0  8.4*   CO2  26  29   BUN  22  18   CR  1.52*  1.14   GLC  308*  235*       TSH   Date Value Ref Range Status   09/12/2017 4.85 0.30 - 5.00 uIU/mL Final       Lab Results   Component Value Date    A1C 9.4 09/12/2017       Assessment/Plan:  Type 2 diabetes mellitus with diabetic nephropathy, without long-term current use of insulin (H)  Uncontrolled. GLC levels remain elevated  1.cont. Metformin  2. Increase glipized to 7.5 mg qd  3. Cont. Qd GLC levels alt times  4. Monitor for changes in po intake  5. Reassess over next week    Dysuria  Improved  1.cont. Septra ss qd  2. Cont. Flomax, finasteride  3. Encourage fluid intake  4. Bmp tomorrow    Primary insomnia  Gen. Stable. Occ. S/s. Less daytime sleepiness  1. Cont. PT  2. Monitor for increased s/s  3. For above, may consider hs trazodone        Electronically signed by  RONY Sanchez CNP

## 2017-09-26 ENCOUNTER — TRANSFERRED RECORDS (OUTPATIENT)
Dept: HEALTH INFORMATION MANAGEMENT | Facility: CLINIC | Age: 82
End: 2017-09-26

## 2017-09-26 LAB
ANION GAP SERPL CALCULATED.3IONS-SCNC: 11 MMOL/L (ref 5–18)
BUN SERPL-MCNC: 34 MG/DL (ref 8–28)
CALCIUM SERPL-MCNC: 9.6 MG/DL (ref 8.5–10.5)
CHLORIDE SERPLBLD-SCNC: 103 MMOL/L (ref 98–107)
CO2 SERPL-SCNC: 20 MMOL/L (ref 22–31)
CREAT SERPL-MCNC: 2.07 MG/DL (ref 0.7–1.3)
GFR SERPL CREATININE-BSD FRML MDRD: 30 ML/MIN/1.73M2
GLUCOSE SERPL-MCNC: 94 MG/DL (ref 70–125)
POTASSIUM SERPL-SCNC: 5.6 MMOL/L (ref 3.5–5)
SODIUM SERPL-SCNC: 134 MMOL/L (ref 136–145)

## 2017-09-28 ENCOUNTER — TRANSFERRED RECORDS (OUTPATIENT)
Dept: HEALTH INFORMATION MANAGEMENT | Facility: CLINIC | Age: 82
End: 2017-09-28

## 2017-09-28 LAB
ANION GAP SERPL CALCULATED.3IONS-SCNC: 10 MMOL/L (ref 5–18)
BUN SERPL-MCNC: 31 MG/DL (ref 8–28)
CALCIUM SERPL-MCNC: 9.1 MG/DL (ref 8.5–10.5)
CHLORIDE SERPLBLD-SCNC: 103 MMOL/L (ref 98–107)
CO2 SERPL-SCNC: 25 MMOL/L (ref 22–31)
CREAT SERPL-MCNC: 1.84 MG/DL (ref 0.7–1.3)
GFR SERPL CREATININE-BSD FRML MDRD: 34 ML/MIN/1.73M2
GLUCOSE SERPL-MCNC: 139 MG/DL (ref 70–125)
POTASSIUM SERPL-SCNC: 4.2 MMOL/L (ref 3.5–5)
SODIUM SERPL-SCNC: 138 MMOL/L (ref 136–145)

## 2017-10-03 PROCEDURE — G0180 MD CERTIFICATION HHA PATIENT: HCPCS | Performed by: INTERNAL MEDICINE

## 2017-10-11 ENCOUNTER — ASSISTED LIVING VISIT (OUTPATIENT)
Dept: GERIATRICS | Facility: CLINIC | Age: 82
End: 2017-10-11
Payer: COMMERCIAL

## 2017-10-11 VITALS
OXYGEN SATURATION: 92 % | HEART RATE: 69 BPM | DIASTOLIC BLOOD PRESSURE: 68 MMHG | RESPIRATION RATE: 18 BRPM | SYSTOLIC BLOOD PRESSURE: 120 MMHG

## 2017-10-11 DIAGNOSIS — F51.01 PRIMARY INSOMNIA: ICD-10-CM

## 2017-10-11 DIAGNOSIS — R30.0 DYSURIA: ICD-10-CM

## 2017-10-11 DIAGNOSIS — E11.21 TYPE 2 DIABETES MELLITUS WITH DIABETIC NEPHROPATHY, WITHOUT LONG-TERM CURRENT USE OF INSULIN (H): Primary | ICD-10-CM

## 2017-10-11 NOTE — PROGRESS NOTES
[facility]    Chief Complaint   Patient presents with     Diabetes       HPI:    Farhad Roa is a 89 year old  (8/28/1923), who is being seen today for an episodic care visit at [facility].  HPI information obtained from: facility chart records, facility staff, patient report and [facility] chart review.Today's concern is: DM, dysuria,insomnia.  Has had elevated GLC levels, recurrent UTIs with dysuria. Cont. On metformin. glipized added last month, dose increased to 7.5 mg qd. GLC levels improved. [id number].  Denies recent dysuria. Not currently taking anbtx. Afebrile. Cont. On proscar, flomax for BPH. Insomnia stable. Has not had recent prn zyprexa at hs. Has been more alert during the day. Increased activity level.       ALLERGIES: Ciprofloxacin; Lisinopril; and Penicillins  Past Medical, Surgical, Family and Social History reviewed and updated in [facility].    Current Outpatient Prescriptions   Medication Sig Dispense Refill     carbamide peroxide (DEBROX) 6.5 % otic solution 3-4 drops daily FOR 4 DAYS THEN FLUSH       GLIPIZIDE PO Take 7.5 mg by mouth every morning (before breakfast)       OLANZapine (ZYPREXA PO) Take 2.5 mg by mouth every 6 hours as needed for agitation       Cranberry 450 MG CAPS Take by mouth 2 times daily       Psyllium (NATURAL FIBER) 48.57 % POWD Take by mouth daily       Probiotic Product (PROBIOTIC DAILY PO) Take by mouth daily       cyanocobalamin (VITAMIN  B-12) 1000 MCG tablet Take 1,000 mcg by mouth daily       glucose 4 G CHEW chewable tablet Take 4 tablets by mouth every hour as needed for low blood sugar       ACETAMINOPHEN PO Take 650 mg by mouth every 4 hours as needed for pain       LEVOTHYROXINE SODIUM PO Take 0.025 mg by mouth daily       tamsulosin (FLOMAX) 0.4 MG capsule Take 0.4 mg by mouth daily        FINASTERIDE PO Take 5 mg by mouth daily       METFORMIN HCL PO Take 1,000 mg by mouth 2 times daily        Medications reviewed:  Medications  reconciled to facility chart and changes were made to reflect current medications as identified as above med list. Below are the changes that were made:   Medications stopped since last EPIC medication reconciliation:   Medications Discontinued During This Encounter   Medication Reason     Sulfamethoxazole-Trimethoprim (SEPTRA PO) Medication Reconciliation Clean Up       Medications started since last [facility] medication reconciliation:  Orders Placed This Encounter   Medications     carbamide peroxide (DEBROX) 6.5 % otic solution     Sig: 3-4 drops daily FOR 4 DAYS THEN FLUSH         REVIEW OF SYSTEMS:  No chest pain, shortness of breath, fevers, chills, headache, nausea, vomiting, dysuria or bowel abnormalities.  Appetite is  normal.  No pain except occ. aches. 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    Physical Exam:  /68  Pulse 69  Resp 18  SpO2 92%  GENERAL APPEARANCE:  Alert, in no distress, cooperative  ENT:  Mouth and posterior oropharynx normal, moist mucous membranes, [place], using pocket talker  EYES:  EOM, conjunctivae, lids, pupils and irises normal, PERRL, no drainage  NECK:  No adenopathy,masses or thyromegaly, no carotid bruit,FROM  RESP:  respiratory effort and palpation of chest normal, lungs clear to auscultation , no respiratory distress  CV:  Palpation and auscultation of heart done , regular rate and rhythm, no murmur, rub, or gallop, no edema  ABDOMEN:  normal bowel sounds, soft, nontender, no hepatosplenomegaly or other masses, no guarding or rebound, no bruits  M/S:   Gait and station normal  muscle strength 5/5 all 4 ext., no apparent difficulty with standing from seated pos.  NEURO:   Cranial nerves 2-12 are normal tested and grossly at patient's baseline, alert,speech clear  PSYCH:  insight and judgement impaired, memory impaired , affect abnormal -flat    Recent Labs:      CBC RESULTS:   Recent Labs   Lab Test 09/12/17 01/08/17   2330   WBC  10.4  11.9*   RBC  3.79*  4.35*   HGB  13.2*  14.5   HCT   39.9*  43.1   MCV  105*  99   MCH  34.8*  33.3*   MCHC  33.1  33.6   RDW  12.9  14.4   PLT  256  256       Last Basic Metabolic Panel:  Recent Labs   Lab Test 09/28/17 09/26/17   NA  138  134*   POTASSIUM  4.2  5.6*   CHLORIDE  103  103   ELISHA  9.1  9.6   CO2  25  20*   BUN  31*  34*   CR  1.84*  2.07*   GLC  139*  94       TSH   Date Value Ref Range Status   09/12/2017 4.85 0.30 - 5.00 uIU/mL Final       Lab Results   Component Value Date    A1C 9.4 09/12/2017       Assessment/Plan:  Type 2 diabetes mellitus with diabetic nephropathy, without long-term current use of insulin (H)  Better controlled. No hypoglycemia  1. Cont.metformin  2. Cont. Glipizide  3. Cont. Qd GLC levels  4.monitor for hypoglycemia    Dysuria  Currently controlled  1. Cont. Cranberry tabs  2. Cont. Proscar, flomax  3. Encourage fluid intake  4. Monitor for reports of dysuria,fever    Primary insomnia  Currently controlled  1. Cont. Prn zyorexa-if not used over next couple weeks, dc  2. Monitor for reports of insomnia  3.monitor for further increased daytime sleepiness        Electronically signed by  RONY Sanchez CNP

## 2017-11-02 ENCOUNTER — TRANSFERRED RECORDS (OUTPATIENT)
Dept: HEALTH INFORMATION MANAGEMENT | Facility: CLINIC | Age: 82
End: 2017-11-02

## 2018-01-31 ENCOUNTER — ASSISTED LIVING VISIT (OUTPATIENT)
Dept: GERIATRICS | Facility: CLINIC | Age: 83
End: 2018-01-31
Payer: COMMERCIAL

## 2018-01-31 VITALS
RESPIRATION RATE: 18 BRPM | OXYGEN SATURATION: 94 % | HEART RATE: 98 BPM | SYSTOLIC BLOOD PRESSURE: 131 MMHG | DIASTOLIC BLOOD PRESSURE: 80 MMHG

## 2018-01-31 DIAGNOSIS — N40.0 BENIGN PROSTATIC HYPERPLASIA WITHOUT LOWER URINARY TRACT SYMPTOMS: ICD-10-CM

## 2018-01-31 DIAGNOSIS — F03.91 DEMENTIA WITH BEHAVIORAL DISTURBANCE, UNSPECIFIED DEMENTIA TYPE: ICD-10-CM

## 2018-01-31 DIAGNOSIS — E11.21 TYPE 2 DIABETES MELLITUS WITH DIABETIC NEPHROPATHY, WITHOUT LONG-TERM CURRENT USE OF INSULIN (H): Primary | ICD-10-CM

## 2018-01-31 NOTE — PROGRESS NOTES
[facility]    Chief Complaint   Patient presents with     Diabetes       HPI:    Farhad Roa is a 89 year old  (8/28/1923), who is being seen today for an episodic care visit at [facility].  HPI information obtained from: facility chart records, facility staff, patient report and [facility] chart review.Today's concern is: DM, BPH, dementia.  GLC levels stable. Had glipizide dose increase 9/17.  Had recurrent UTIs, dysuria. No recent dysuria since GLC better controlled. For BPH cont. On flomax, finasteride.  For dementia cont. On zyprexa. No recent reports of insomnia. Mood, behaviors gen. Stable.       ALLERGIES: Ciprofloxacin; Lisinopril; and Penicillins  Past Medical, Surgical, Family and Social History reviewed and updated in [facility].    Current Outpatient Prescriptions   Medication Sig Dispense Refill     GLIPIZIDE PO Take 7.5 mg by mouth every morning (before breakfast)       OLANZapine (ZYPREXA PO) Take 2.5 mg by mouth every 6 hours as needed for agitation       Cranberry 450 MG CAPS Take by mouth 2 times daily       Psyllium (NATURAL FIBER) 48.57 % POWD Take by mouth daily       Probiotic Product (PROBIOTIC DAILY PO) Take by mouth daily       cyanocobalamin (VITAMIN  B-12) 1000 MCG tablet Take 1,000 mcg by mouth every other day        glucose 4 G CHEW chewable tablet Take 4 tablets by mouth every hour as needed for low blood sugar       ACETAMINOPHEN PO Take 650 mg by mouth every 4 hours as needed for pain       LEVOTHYROXINE SODIUM PO Take 0.025 mg by mouth daily       tamsulosin (FLOMAX) 0.4 MG capsule Take 0.4 mg by mouth daily        FINASTERIDE PO Take 5 mg by mouth daily       METFORMIN HCL PO Take 1,000 mg by mouth 2 times daily        Medications reviewed:  Medications reconciled to facility chart and changes were made to reflect current medications as identified as above med list. Below are the changes that were made:   Medications stopped since last EPIC medication  reconciliation:   There are no discontinued medications.    Medications started since last [facility] medication reconciliation:  No orders of the defined types were placed in this encounter.        REVIEW OF SYSTEMS:  Unobtainable secondary to cognitive impairment or aphasia, but today pt reports feeling fine    Physical Exam:  /80  Pulse 98  Resp 18  SpO2 94%  GENERAL APPEARANCE:  Alert, in no distress, cooperative  ENT:  Mouth and posterior oropharynx normal, moist mucous membranes, [place]  EYES:  EOM, conjunctivae, lids, pupils and irises normal, PERRL  NECK:  No adenopathy,masses or thyromegaly, no carotid bruit  RESP:  respiratory effort and palpation of chest normal, lungs clear to auscultation , no respiratory distress  CV:  Palpation and auscultation of heart done , regular rate and rhythm, no murmur, rub, or gallop, no edema  ABDOMEN:  normal bowel sounds, soft, nontender, no hepatosplenomegaly or other masses, no guarding or rebound  M/S:   Gait and station normal  muscle strength 5/5 all 4 ext., normal tone  NEURO:   Cranial nerves 2-12 are normal tested and grossly at patient's baseline, alert, speech clear  PSYCH:  insight and judgement impaired, memory impaired , affect and mood normal    Recent Labs:     CBC RESULTS:   Recent Labs   Lab Test 09/12/17 01/08/17   2330   WBC  10.4  11.9*   RBC  3.79*  4.35*   HGB  13.2*  14.5   HCT  39.9*  43.1   MCV  105*  99   MCH  34.8*  33.3*   MCHC  33.1  33.6   RDW  12.9  14.4   PLT  256  256       Last Basic Metabolic Panel:  Recent Labs   Lab Test 09/28/17 09/26/17   NA  138  134*   POTASSIUM  4.2  5.6*   CHLORIDE  103  103   ELISHA  9.1  9.6   CO2  25  20*   BUN  31*  34*   CR  1.84*  2.07*   GLC  139*  94       TSH   Date Value Ref Range Status   09/12/2017 4.85 0.30 - 5.00 uIU/mL Final       Lab Results   Component Value Date    A1C 9.4 09/12/2017       Assessment/Plan:  Type 2 diabetes mellitus with diabetic nephropathy, without long-term current use of  insulin (H)  Currently controlled  1. Cont. Glipizide, metformin  2. Cont. Weekly GLC levels  3. Monitor for changes in po intake    Benign prostatic hyperplasia without lower urinary tract symptoms  Gen. Stable.  1. Cont. Finasteride, flomax  2. Monitor for reports of dysuria  3. Monitor for fever, increased urinary freq    Dementia with behavioral disturbance, unspecified dementia type  Memory loss. Mood, behaviors gen. Stable  1. Cont. zyprexa  2. Monitor for delusions, hallucinations  3. Monitor for insomnia      Electronically signed by  RONY Sanchez CNP

## 2018-02-26 ENCOUNTER — ASSISTED LIVING VISIT (OUTPATIENT)
Dept: GERIATRICS | Facility: CLINIC | Age: 83
End: 2018-02-26
Payer: COMMERCIAL

## 2018-02-26 VITALS
DIASTOLIC BLOOD PRESSURE: 67 MMHG | RESPIRATION RATE: 18 BRPM | OXYGEN SATURATION: 94 % | HEART RATE: 68 BPM | SYSTOLIC BLOOD PRESSURE: 131 MMHG

## 2018-02-26 DIAGNOSIS — N30.00 ACUTE CYSTITIS WITHOUT HEMATURIA: ICD-10-CM

## 2018-02-26 DIAGNOSIS — E11.21 TYPE 2 DIABETES MELLITUS WITH DIABETIC NEPHROPATHY, WITHOUT LONG-TERM CURRENT USE OF INSULIN (H): Primary | ICD-10-CM

## 2018-02-26 DIAGNOSIS — E03.9 ACQUIRED HYPOTHYROIDISM: ICD-10-CM

## 2018-02-26 NOTE — PROGRESS NOTES
[facility]  Chief Complaint   Patient presents with     Annual Comprehensive Exam Assisted Living       HPI:    Farhad Roa is a 89 year old  (8/28/1923), who is being seen today for an annual comprehensive visit at [facility].  HPI information obtained from: facility chart records, facility staff, patient report and [facility] chart review.  Today's concerns are:  Type 2 diabetes mellitus with diabetic nephropathy, without long-term current use of insulin (H)  GLC levels stable. Cont. On metformin, glipizide.     Acute cystitis without hematuria  Stable. No recent c/o dysuria-has h/oUTIs, sig. Improved with better controlled GLC levels. For BPH cont. On flomax, finasteride. Also taking cranberry tabs    Acquired hypothyroidism  Stable. Cont. On synthroid.       BP goals are  <140/90 mm Hg.This is higher than ACC and AHA recommendations due to goals of care. Patient is stable and continue without pharmacological invention with routine assessment.    ALLERGIES: Ciprofloxacin; Lisinopril; and Penicillins  PROBLEM LIST:  Patient Active Problem List   Diagnosis     Subdural hematoma (H)     Acute cystitis without hematuria     Chronic gout of multiple sites     Benign prostatic hyperplasia without lower urinary tract symptoms     Type 2 diabetes mellitus with diabetic nephropathy, without long-term current use of insulin (H)     Acquired hypothyroidism     Decubitus ulcer of sacral region, stage 2     PAST MEDICAL HISTORY:  has a past medical history of Cancer (H); Depressive disorder; Diabetes (H); Hypertension; and Thyroid disease.  PAST SURGICAL HISTORY:  has no past surgical history on file.  FAMILY HISTORY: family history is not on file.  SOCIAL HISTORY:    IMMUNIZATIONS:    There is no immunization history on file for this patient.  Above immunizations pulled from [facility]. [facility] and facility records also reconciled. Outstanding information sent to  to update  [facility].  Future immunizations are not needed at this point as all recommended immunizations are up to date.   MEDICATIONS:  Current Outpatient Prescriptions   Medication Sig Dispense Refill     GLIPIZIDE PO Take 7.5 mg by mouth every morning (before breakfast)       OLANZapine (ZYPREXA PO) Take 2.5 mg by mouth every 6 hours as needed for agitation       Cranberry 450 MG CAPS Take by mouth 2 times daily       Psyllium (NATURAL FIBER) 48.57 % POWD Take by mouth daily       Probiotic Product (PROBIOTIC DAILY PO) Take by mouth daily       cyanocobalamin (VITAMIN  B-12) 1000 MCG tablet Take 1,000 mcg by mouth every other day        glucose 4 G CHEW chewable tablet Take 4 tablets by mouth every hour as needed for low blood sugar       ACETAMINOPHEN PO Take 650 mg by mouth every 4 hours as needed for pain       LEVOTHYROXINE SODIUM PO Take 0.025 mg by mouth daily       tamsulosin (FLOMAX) 0.4 MG capsule Take 0.4 mg by mouth daily        FINASTERIDE PO Take 5 mg by mouth daily       METFORMIN HCL PO Take 1,000 mg by mouth 2 times daily        Medications reviewed:  Medications reconciled to facility chart and changes were made to reflect current medications as identified as above med list. Below are the changes that were made:   Medications stopped since last EPIC medication reconciliation:   There are no discontinued medications.    Medications started since last [facility] medication reconciliation:  No orders of the defined types were placed in this encounter.        Case Management:  I have reviewed the Assisted Living care plan, current immunizations and preventive care/cancer screening..Future cancer screening is not clinically indicated secondary to age/goals of care Patient's desire to return to the community is not assessible due to cognitive impairment. Current Level of Care is appropriate.    Advance Directive Discussion:    I reviewed the current advanced directives as reflected in EPIC, the POLST and the  facility chart, and verified the congruency of orders 2/26/18. I contacted the first party dtr and left a message regarding the plan of Care.  I did not due to cognitive impairment review the advance directives with the resident.     Team Discussion:  I communicated with the appropriate disciplines involved with the Plan of Care:   Nursing      Patient Goal:  Patient's goal is unobtainable secondary to cognitive impairment.    Information reviewed:  Medications, vital signs, orders, and nursing notes.    ROS:  Limited secondary to cognitive impairment but today pt reports feeling fine    Exam:  /67  Pulse 68  Resp 18  SpO2 94%    GENERAL APPEARANCE:  Alert, in no distress, cooperative  ENT:  Mouth and posterior oropharynx normal, moist mucous membranes, [place]  EYES:  EOM, conjunctivae, lids, pupils and irises normal, PERRL  NECK:  No adenopathy,masses or thyromegaly  RESP:  respiratory effort and palpation of chest normal, lungs clear to auscultation , no respiratory distress  CV:  Palpation and auscultation of heart done , regular rate and rhythm, no murmur, rub, or gallop, no edema  ABDOMEN:  normal bowel sounds, soft, nontender, no hepatosplenomegaly or other masses, no guarding or rebound  M/S:   Gait and station normal  muscle strength 5/5 all 4 ext., normal tone  NEURO:   Cranial nerves 2-12 are normal tested and grossly at patient's baseline, no tremor  PSYCH:  insight and judgement impaired, memory impaired , affect and mood normal     Lab/Diagnostic data:      CBC RESULTS:   Recent Labs   Lab Test 09/12/17 01/08/17   2330   WBC  10.4  11.9*   RBC  3.79*  4.35*   HGB  13.2*  14.5   HCT  39.9*  43.1   MCV  105*  99   MCH  34.8*  33.3*   MCHC  33.1  33.6   RDW  12.9  14.4   PLT  256  256       Last Basic Metabolic Panel:  Recent Labs   Lab Test 09/28/17 09/26/17   NA  138  134*   POTASSIUM  4.2  5.6*   CHLORIDE  103  103   ELISHA  9.1  9.6   CO2  25  20*   BUN  31*  34*   CR  1.84*  2.07*   GLC  139*  94        TSH   Date Value Ref Range Status   09/12/2017 4.85 0.30 - 5.00 uIU/mL Final       Lab Results   Component Value Date    A1C 9.4 09/12/2017       ASSESSMENT/PLAN  Type 2 diabetes mellitus with diabetic nephropathy, without long-term current use of insulin (H)  Currently controlled  1. Cont. Metformin, glipizide  2. Cont. Qd GLC levels alt. Times  3. hgba1c tomorrow    Acute cystitis without hematuria  Currently controlled-improved with better controlled DM  1. Cont. Cranberry tabs  2. Encourage fluid intake  3. Cont. Finasteride, flomax for BPH  4. Monitor for further dysuria    Acquired hypothyroidism  Controlled  1. Cont. Synthroid  2. tsh tomorrow  3. Ast, alt, bmp tomorrow    Electronically signed by:  RONY Sanchez CNP

## 2018-02-27 ENCOUNTER — RECORDS - HEALTHEAST (OUTPATIENT)
Dept: LAB | Facility: CLINIC | Age: 83
End: 2018-02-27

## 2018-02-27 ENCOUNTER — TRANSFERRED RECORDS (OUTPATIENT)
Dept: HEALTH INFORMATION MANAGEMENT | Facility: CLINIC | Age: 83
End: 2018-02-27

## 2018-02-27 LAB
ALT SERPL W P-5'-P-CCNC: 15 U/L (ref 0–45)
ALT SERPL-CCNC: 15 U/L (ref 0–45)
ANION GAP SERPL CALCULATED.3IONS-SCNC: 9 MMOL/L (ref 5–18)
ANION GAP SERPL CALCULATED.3IONS-SCNC: 9 MMOL/L (ref 5–18)
AST SERPL W P-5'-P-CCNC: 24 U/L (ref 0–40)
AST SERPL-CCNC: 24 U/L (ref 0–40)
BUN SERPL-MCNC: 21 MG/DL (ref 8–28)
BUN SERPL-MCNC: 21 MG/DL (ref 8–28)
CALCIUM SERPL-MCNC: 8.7 MG/DL (ref 8.5–10.5)
CALCIUM SERPL-MCNC: 8.7 MG/DL (ref 8.5–10.5)
CHLORIDE BLD-SCNC: 104 MMOL/L (ref 98–107)
CHLORIDE SERPLBLD-SCNC: 104 MMOL/L (ref 98–107)
CO2 SERPL-SCNC: 23 MMOL/L (ref 22–31)
CO2 SERPL-SCNC: 23 MMOL/L (ref 22–31)
CREAT SERPL-MCNC: 1.36 MG/DL (ref 0.7–1.3)
CREAT SERPL-MCNC: 1.36 MG/DL (ref 0.7–1.3)
GFR SERPL CREATININE-BSD FRML MDRD: 49 ML/MIN/1.73M2
GFR SERPL CREATININE-BSD FRML MDRD: 49 ML/MIN/1.73M2
GLUCOSE BLD-MCNC: 198 MG/DL (ref 70–125)
GLUCOSE SERPL-MCNC: 198 MG/DL (ref 70–125)
HBA1C MFR BLD: 6.4 % (ref 4.2–6.1)
POTASSIUM BLD-SCNC: 4.5 MMOL/L (ref 3.5–5)
POTASSIUM SERPL-SCNC: 4.5 MMOL/L (ref 3.5–5)
SODIUM SERPL-SCNC: 136 MMOL/L (ref 136–145)
SODIUM SERPL-SCNC: 136 MMOL/L (ref 136–145)
TSH SERPL DL<=0.005 MIU/L-ACNC: 3.87 UIU/ML (ref 0.3–5)
TSH SERPL-ACNC: 3.87 UIU/ML (ref 0.3–5)

## 2018-03-12 ENCOUNTER — ASSISTED LIVING VISIT (OUTPATIENT)
Dept: GERIATRICS | Facility: CLINIC | Age: 83
End: 2018-03-12
Payer: COMMERCIAL

## 2018-03-12 VITALS
RESPIRATION RATE: 20 BRPM | HEART RATE: 70 BPM | SYSTOLIC BLOOD PRESSURE: 120 MMHG | DIASTOLIC BLOOD PRESSURE: 68 MMHG | WEIGHT: 184 LBS | TEMPERATURE: 97.1 F

## 2018-03-12 DIAGNOSIS — E11.21 TYPE 2 DIABETES MELLITUS WITH DIABETIC NEPHROPATHY, WITHOUT LONG-TERM CURRENT USE OF INSULIN (H): Primary | ICD-10-CM

## 2018-03-12 DIAGNOSIS — F02.80 LATE ONSET ALZHEIMER'S DISEASE WITHOUT BEHAVIORAL DISTURBANCE (H): ICD-10-CM

## 2018-03-12 DIAGNOSIS — G30.1 LATE ONSET ALZHEIMER'S DISEASE WITHOUT BEHAVIORAL DISTURBANCE (H): ICD-10-CM

## 2018-03-12 DIAGNOSIS — N39.0 RECURRENT UTI: ICD-10-CM

## 2018-03-12 DIAGNOSIS — E03.9 ACQUIRED HYPOTHYROIDISM: ICD-10-CM

## 2018-03-12 DIAGNOSIS — N40.0 BENIGN PROSTATIC HYPERPLASIA WITHOUT LOWER URINARY TRACT SYMPTOMS: ICD-10-CM

## 2018-03-18 NOTE — PROGRESS NOTES
Farhad Roa is a 89 year old male seen March 12, 2018 at [facility] where he has resided for 10 months (admit 5/2017) seen to follow up DM2 and dementia.   He is seen in his room, with his daughter Suellen present and she helps with history.       Patient had [facility] admission 12/2016 for UTI and fall at home in which he suffered SDH.   He went to [facility], but had another fall and was readmitted to [facility].   He was ultimately transferred here from previous AL for more support.     Patient has had DM2 x10-15 years, secondary nephropathy, CKD stage 3.  Recent blood sugars have been , currently on metformin and glipizide.     He has had recurrent UTIs.   Records from [facility] not available, but reported to have urachal cyst and partial cystectomy, [facility]  He has not had any recent dysuria or c/o  pain.      Patient has had ogoing cognitive decline.   He sits in his chair preferentially, but can be restless at times and up at night.   Daughter reports he called her at 1:30 AM recently, which is unusual for him.   He went out to the kitchen and called on the facility phone, stated he wanted to go home.       PMH:  DM2, x10+ years   Dementia, late onset  Paget's disease of skull.  Hypothyroidism  H/o SDH, 1/2017  Hearing loss  BPH  H/o urachal cyst  S/p partial cystectomy  Recurrent UTIs       SH:   x7 years.  Previously lived at the [facility]  Daughter Suellen is here today.   Has another daughter Rebeka.       ROS:  occ headache, no other pain     Wt Readings from Last 5 Encounters:   03/12/18 184 lb (83.5 kg)   09/11/17 187 lb (84.8 kg)   01/03/17 189 lb 3.2 oz (85.8 kg)   01/03/17 189 lb 3.2 oz (85.8 kg)     EXAM:  Pleasant, NAD  /68  Pulse 70  Temp 97.1  F (36.2  C)  Resp 20  Wt 184 lb (83.5 kg)   Neck supple without adenopathy  Lungs clear bilaterally with fair air movement.     Heart RRR s1s2, 2/6 JESSICA  Abd soft, NT, no distention, +BS  Ext without  edema  Neuro: limited history, no focal findings.   Psych: affect okay       Last Basic Metabolic Panel:  Lab Results   Component Value Date     02/27/2018      Lab Results   Component Value Date    POTASSIUM 4.5 02/27/2018     Lab Results   Component Value Date    CHLORIDE 104 02/27/2018     Lab Results   Component Value Date    ELISHA 8.7 02/27/2018     Lab Results   Component Value Date    CO2 23 02/27/2018     Lab Results   Component Value Date    BUN 21 02/27/2018     Lab Results   Component Value Date    CR 1.36 02/27/2018   GFR 49  Lab Results   Component Value Date     02/27/2018     TSH   Date Value Ref Range Status   02/27/2018 3.87 0.30 - 5.00 uIU/mL Final     Lab Results   Component Value Date    AST 24 02/27/2018     Lab Results   Component Value Date    ALT 15 02/27/2018       IMP/PLAN:  (N39.0) Recurrent urinary tract infection  Comment: complicated  history, but decreased infections since improved DM control.     Plan: continue cranberry tabs, follow     Would need Urologic eval for more definitive tx if recurrent; however family disinclined to aggressive tx or catheter placement.       (N40.0) Benign prostatic hyperplasia without lower urinary tract symptoms  Comment: no current symptoms, remains incontinent   Plan: continue tamsulosin and finasteride.       (E11.21) Type 2 diabetes mellitus with diabetic nephropathy, without long-term current use of insulin (H)  Comment: much improved with addition of glipizide   Plan: continue metformin bid and daily glipizide, recheck A1C.   He is not on statin or daily ASA due to age and goals of care.     (E03.9) Acquired hypothyroidism  Comment: on replacement  Plan: yearly TSH       (G30.1,  F02.80) Late onset Alzheimer's disease without behavioral disturbance  Comment: with disorientation, low functional status; no behavioral symptoms currently  Plan:    Memory Care [facility] for support with meds, meals, cares, activity.   Needs cuing and redirection  as above.        Heidy Melton MD

## 2018-05-02 ENCOUNTER — ASSISTED LIVING VISIT (OUTPATIENT)
Dept: GERIATRICS | Facility: CLINIC | Age: 83
End: 2018-05-02
Payer: COMMERCIAL

## 2018-05-02 DIAGNOSIS — G30.1 LATE ONSET ALZHEIMER'S DISEASE WITHOUT BEHAVIORAL DISTURBANCE (H): ICD-10-CM

## 2018-05-02 DIAGNOSIS — N40.0 BENIGN PROSTATIC HYPERPLASIA WITHOUT LOWER URINARY TRACT SYMPTOMS: Primary | ICD-10-CM

## 2018-05-02 DIAGNOSIS — F02.80 LATE ONSET ALZHEIMER'S DISEASE WITHOUT BEHAVIORAL DISTURBANCE (H): ICD-10-CM

## 2018-05-02 DIAGNOSIS — R30.0 DYSURIA: ICD-10-CM

## 2018-05-02 NOTE — PROGRESS NOTES
[facility]    Chief Complaint   Patient presents with     Dysuria       [facility] Medical Record Number:  [id number]    HPI:    Farhad Roa is a 89 year old  (8/28/1923), who is being seen today for an episodic care visit at [facility].  HPI information obtained from: facility chart records, facility staff, patient report and [facility] chart review.Today's concern is: dysuria, BPH, alzheimer's.  Has h/o dysuria, DM with elevated GLC levels. Improved since increase DM meds. Cont. On glipizide, metformin. For BPH cont. On finasteride, flomax. No reports of increased s/s urinary retention, UTI. Has memory loss due to alzheimer's. No change in mood, behavior. No recent functional decline.       ALLERGIES: Ciprofloxacin; Lisinopril; and Penicillins  Past Medical, Surgical, Family and Social History reviewed and updated in [facility].    Current Outpatient Prescriptions   Medication Sig Dispense Refill     ACETAMINOPHEN PO Take 650 mg by mouth every 4 hours as needed for pain       Cranberry 450 MG CAPS Take by mouth 2 times daily       cyanocobalamin (VITAMIN  B-12) 1000 MCG tablet Take 1,000 mcg by mouth every other day        FINASTERIDE PO Take 5 mg by mouth daily       GLIPIZIDE PO Take 7.5 mg by mouth every morning (before breakfast)       LEVOTHYROXINE SODIUM PO Take 25 mcg by mouth daily        METFORMIN HCL PO Take 1,000 mg by mouth 2 times daily        Psyllium (NATURAL FIBER) 48.57 % POWD Take by mouth daily       tamsulosin (FLOMAX) 0.4 MG capsule Take 0.4 mg by mouth daily        Medications reviewed:  Medications reconciled to facility chart and changes were made to reflect current medications as identified as above med list. Below are the changes that were made:   Medications stopped since last EPIC medication reconciliation:   There are no discontinued medications.    Medications started since last [facility] medication reconciliation:  No orders of the defined types were placed in this  encounter.        REVIEW OF SYSTEMS:  Limited secondary to cognitive impairment but today pt reports feeling ok, no dysuria    Physical Exam:  /76  Pulse 78  Resp 16  SpO2 93%  GENERAL APPEARANCE:  Alert, in no distress, cooperative  ENT:  Mouth and posterior oropharynx normal, moist mucous membranes, [place]  EYES:  EOM, conjunctivae, lids, pupils and irises normal, PERRL  NECK:  No adenopathy,masses or thyromegaly, no carotid bruit  RESP:  respiratory effort and palpation of chest normal, lungs clear to auscultation , no respiratory distress  CV:  Palpation and auscultation of heart done , regular rate and rhythm, no murmur, rub, or gallop, no edema  ABDOMEN:  normal bowel sounds, soft, nontender, no hepatosplenomegaly or other masses, no guarding or rebound  M/S:   Gait and station normal  muscle strength 5/5 all 4 ext., normal tone  NEURO:   Cranial nerves 2-12 are normal tested and grossly at patient's baseline, alert, speech clear  PSYCH:  insight and judgement impaired, memory impaired , affect and mood normal    Recent Labs:     CBC RESULTS:   Recent Labs   Lab Test 09/12/17 01/08/17   2330   WBC  10.4  11.9*   RBC  3.79*  4.35*   HGB  13.2*  14.5   HCT  39.9*  43.1   MCV  105*  99   MCH  34.8*  33.3*   MCHC  33.1  33.6   RDW  12.9  14.4   PLT  256  256       Last Basic Metabolic Panel:  Recent Labs   Lab Test 02/27/18 09/28/17   NA  136  138   POTASSIUM  4.5  4.2   CHLORIDE  104  103   ELISHA  8.7  9.1   CO2  23  25   BUN  21  31*   CR  1.36*  1.84*   GLC  198*  139*       Liver Function Studies -   Recent Labs   Lab Test 02/27/18 01/08/17   2330   PROTTOTAL   --   7.9   ALBUMIN   --   3.6   BILITOTAL   --   1.0   ALKPHOS   --   458*   AST  24  27   ALT  15  28       TSH   Date Value Ref Range Status   02/27/2018 3.87 0.30 - 5.00 uIU/mL Final   09/12/2017 4.85 0.30 - 5.00 uIU/mL Final       Lab Results   Component Value Date    A1C 9.4 09/12/2017       Assessment/Plan:  Dysuria  Currently controlled  1.  Cont. Glipizide,metfromin  2. Monitor for reports of dysuria  3. Monitor for fever    Benign prostatic hyperplasia without lower urinary tract symptoms  Gen. Controlled  1. Cont. Flomax, finasteride  2. Monitor for reports of increased urinary freq.  3. 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 Mood, behaviors stable  1. Cont. Secured memory care unit  2. Monitor for changes in mood, behavior  3. Monitor for functional decline      Electronically signed by  RONY Sanchez CNP

## 2018-05-02 NOTE — LETTER
5/2/2018        RE: Farhad Roa  Care of Suellen Cook  [address] [facility] [address]        [facility]    Chief Complaint   Patient presents with     Dysuria       [place] Medical Record Number:  [id number]    HPI:    Farhad Roa is a 89 year old  (8/28/1923), who is being seen today for an episodic care visit at [facility].  HPI information obtained from: facility chart records, facility staff, patient report and [facility] chart review.Today's concern is: dysuria, BPH, alzheimer's.  Has h/o dysuria, DM with elevated GLC levels. Improved since increase DM meds. Cont. On glipizide, metformin. For BPH cont. On finasteride, flomax. No reports of increased s/s urinary retention, UTI. Has memory loss due to alzheimer's. No change in mood, behavior. No recent functional decline.       ALLERGIES: Ciprofloxacin; Lisinopril; and Penicillins  Past Medical, Surgical, Family and Social History reviewed and updated in [facility].    Current Outpatient Prescriptions   Medication Sig Dispense Refill     ACETAMINOPHEN PO Take 650 mg by mouth every 4 hours as needed for pain       Cranberry 450 MG CAPS Take by mouth 2 times daily       cyanocobalamin (VITAMIN  B-12) 1000 MCG tablet Take 1,000 mcg by mouth every other day        FINASTERIDE PO Take 5 mg by mouth daily       GLIPIZIDE PO Take 7.5 mg by mouth every morning (before breakfast)       LEVOTHYROXINE SODIUM PO Take 25 mcg by mouth daily        METFORMIN HCL PO Take 1,000 mg by mouth 2 times daily        Psyllium (NATURAL FIBER) 48.57 % POWD Take by mouth daily       tamsulosin (FLOMAX) 0.4 MG capsule Take 0.4 mg by mouth daily        Medications reviewed:  Medications reconciled to facility chart and changes were made to reflect current medications as identified as above med list. Below are the changes that were made:   Medications stopped since last EPIC medication reconciliation:   There are no discontinued  medications.    Medications started since last [facility] medication reconciliation:  No orders of the defined types were placed in this encounter.        REVIEW OF SYSTEMS:  Limited secondary to cognitive impairment but today pt reports feeling ok, no dysuria    Physical Exam:  /76  Pulse 78  Resp 16  SpO2 93%  GENERAL APPEARANCE:  Alert, in no distress, cooperative  ENT:  Mouth and posterior oropharynx normal, moist mucous membranes, [place]  EYES:  EOM, conjunctivae, lids, pupils and irises normal, PERRL  NECK:  No adenopathy,masses or thyromegaly, no carotid bruit  RESP:  respiratory effort and palpation of chest normal, lungs clear to auscultation , no respiratory distress  CV:  Palpation and auscultation of heart done , regular rate and rhythm, no murmur, rub, or gallop, no edema  ABDOMEN:  normal bowel sounds, soft, nontender, no hepatosplenomegaly or other masses, no guarding or rebound  M/S:   Gait and station normal  muscle strength 5/5 all 4 ext., normal tone  NEURO:   Cranial nerves 2-12 are normal tested and grossly at patient's baseline, alert, speech clear  PSYCH:  insight and judgement impaired, memory impaired , affect and mood normal    Recent Labs:     CBC RESULTS:   Recent Labs   Lab Test 09/12/17 01/08/17   2330   WBC  10.4  11.9*   RBC  3.79*  4.35*   HGB  13.2*  14.5   HCT  39.9*  43.1   MCV  105*  99   MCH  34.8*  33.3*   MCHC  33.1  33.6   RDW  12.9  14.4   PLT  256  256       Last Basic Metabolic Panel:  Recent Labs   Lab Test 02/27/18 09/28/17   NA  136  138   POTASSIUM  4.5  4.2   CHLORIDE  104  103   ELISHA  8.7  9.1   CO2  23  25   BUN  21  31*   CR  1.36*  1.84*   GLC  198*  139*       Liver Function Studies -   Recent Labs   Lab Test 02/27/18 01/08/17   2330   PROTTOTAL   --   7.9   ALBUMIN   --   3.6   BILITOTAL   --   1.0   ALKPHOS   --   458*   AST  24  27   ALT  15  28       TSH   Date Value Ref Range Status   02/27/2018 3.87 0.30 - 5.00 uIU/mL Final   09/12/2017 4.85 0.30 - 5.00  uIU/mL Final       Lab Results   Component Value Date    A1C 9.4 09/12/2017       Assessment/Plan:  Dysuria  Currently controlled  1. Cont. Glipizide,metfromin  2. Monitor for reports of dysuria  3. Monitor for fever    Benign prostatic hyperplasia without lower urinary tract symptoms  Gen. Controlled  1. Cont. Flomax, finasteride  2. Monitor for reports of increased urinary freq.  3. 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 Mood, behaviors stable  1. Cont. Secured memory care unit  2. Monitor for changes in mood, behavior  3. Monitor for functional decline      Electronically signed by  RONY Sanchez CNP                      Sincerely,        RONY Sanchez CNP

## 2018-05-03 VITALS
OXYGEN SATURATION: 93 % | DIASTOLIC BLOOD PRESSURE: 76 MMHG | HEART RATE: 78 BPM | SYSTOLIC BLOOD PRESSURE: 141 MMHG | RESPIRATION RATE: 16 BRPM

## 2018-07-25 ENCOUNTER — ASSISTED LIVING VISIT (OUTPATIENT)
Dept: GERIATRICS | Facility: CLINIC | Age: 83
End: 2018-07-25
Payer: COMMERCIAL

## 2018-07-25 VITALS
DIASTOLIC BLOOD PRESSURE: 65 MMHG | OXYGEN SATURATION: 94 % | SYSTOLIC BLOOD PRESSURE: 117 MMHG | HEART RATE: 65 BPM | RESPIRATION RATE: 18 BRPM

## 2018-07-25 DIAGNOSIS — E11.21 TYPE 2 DIABETES MELLITUS WITH DIABETIC NEPHROPATHY, WITHOUT LONG-TERM CURRENT USE OF INSULIN (H): ICD-10-CM

## 2018-07-25 DIAGNOSIS — R30.0 DYSURIA: Primary | ICD-10-CM

## 2018-07-25 DIAGNOSIS — F03.90 DEMENTIA WITHOUT BEHAVIORAL DISTURBANCE, UNSPECIFIED DEMENTIA TYPE: ICD-10-CM

## 2018-07-25 NOTE — LETTER
7/25/2018        RE: Farhad Roa  Care Of Suellen Cook  [address] [facility] [address]        [facility]    Chief Complaint   Patient presents with     Urinary Problem       [place] Medical Record Number:  [id number]    HPI:    Farhad Roa is a 89 year old  (8/28/1923), who is being seen today for an episodic care visit at [facility].  HPI information obtained from: facility chart records, facility staff, patient report and [facility] chart review.Today's concern is: dysuria, DM, dementia. Has BPH. Cont. On flomax, cranberry tabs. Has h/o dysuria, recurrent UTIs. Sig. More stable with better controlled. GLC levels. Cont.on glipizide, metformin for DM.  GLC levels 80s-low 100s.  Has memory loss due to dementia. Mood, behaviors gen. Stable. Not currently taking psych meds.       ALLERGIES: Ciprofloxacin; Lisinopril; and Penicillins  Past Medical, Surgical, Family and Social History reviewed and updated in [facility].    Current Outpatient Prescriptions   Medication Sig Dispense Refill     ACETAMINOPHEN PO Take 650 mg by mouth every 4 hours as needed for pain       Cranberry 450 MG CAPS Take by mouth 2 times daily       cyanocobalamin (VITAMIN  B-12) 1000 MCG tablet Take 1,000 mcg by mouth every other day        FINASTERIDE PO Take 5 mg by mouth daily       GLIPIZIDE PO Take 7.5 mg by mouth every morning (before breakfast)       LEVOTHYROXINE SODIUM PO Take 25 mcg by mouth daily        METFORMIN HCL PO Take 1,000 mg by mouth 2 times daily        Psyllium (NATURAL FIBER) 48.57 % POWD Take by mouth daily       tamsulosin (FLOMAX) 0.4 MG capsule Take 0.4 mg by mouth daily        Medications reviewed:  Medications reconciled to facility chart and changes were made to reflect current medications as identified as above med list. Below are the changes that were made:   Medications stopped since last EPIC medication reconciliation:   There are no discontinued  medications.    Medications started since last [facility] medication reconciliation:  No orders of the defined types were placed in this encounter.        REVIEW OF SYSTEMS:  Limited secondary to cognitive impairment but today pt reports feeling ok    Physical Exam:  /65  Pulse 65  Resp 18  SpO2 94%  GENERAL APPEARANCE:  Alert, in no distress, cooperative  ENT:  Mouth and posterior oropharynx normal, moist mucous membranes, [place]  EYES:  EOM, conjunctivae, lids, pupils and irises normal, PERRL  NECK:  No adenopathy,masses or thyromegaly  RESP:  respiratory effort and palpation of chest normal, lungs clear to auscultation , no respiratory distress  CV:  Palpation and auscultation of heart done , regular rate and rhythm, no murmur, rub, or gallop, no edema  ABDOMEN:  normal bowel sounds, soft, nontender, no hepatosplenomegaly or other masses, no guarding or rebound  M/S:   Gait and station normal  muscle strength 5/5 all 4 ext., normal tone  NEURO:   Cranial nerves 2-12 are normal tested and grossly at patient's baseline, alert, speech clear  PSYCH:  insight and judgement impaired, memory impaired , affect and mood normal    Recent Labs:     Last Basic Metabolic Panel:  Recent Labs   Lab Test 02/27/18 09/28/17   NA  136  138   POTASSIUM  4.5  4.2   CHLORIDE  104  103   ELISHA  8.7  9.1   CO2  23  25   BUN  21  31*   CR  1.36*  1.84*   GLC  198*  139*       Liver Function Studies -   Recent Labs   Lab Test 02/27/18 01/08/17   2330   PROTTOTAL   --   7.9   ALBUMIN   --   3.6   BILITOTAL   --   1.0   ALKPHOS   --   458*   AST  24  27   ALT  15  28       TSH   Date Value Ref Range Status   02/27/2018 3.87 0.30 - 5.00 uIU/mL Final   09/12/2017 4.85 0.30 - 5.00 uIU/mL Final       Assessment/Plan:  Dysuria  Currently controlled  1. Cont. Cranberry tabs  2. Cont. flomax  3.monitor for dysuria, increased freq.  4. Encourage fluid intake      Type 2 diabetes mellitus with diabetic nephropathy, without long-term current use of  insulin (H)  GLC levels stable  1. Cont. Metformin, glipizide  2. Cont. Every day GLC levels  3. hgb a1c in next 1-3 mos  4. Monitor for changes in po intake        Dementia without behavioral disturbance, unspecified dementia type  Memory loss. Mood, behaviors stable  1. Cont. Secured memory care unit  2. Monitor for increased confusions  3. Monitor for changes in mod, behaviors, cont. Comfort cares        Electronically signed by  RONY Sanchez CNP                      Sincerely,        RONY Sanchez CNP

## 2018-07-25 NOTE — PROGRESS NOTES
[facility]    Chief Complaint   Patient presents with     Urinary Problem       [place] Medical Record Number:  [id number]    HPI:    Farhad Roa is a 89 year old  (8/28/1923), who is being seen today for an episodic care visit at [facility].  HPI information obtained from: facility chart records, facility staff, patient report and [facility] chart review.Today's concern is: dysuria, DM, dementia. Has BPH. Cont. On flomax, cranberry tabs. Has h/o dysuria, recurrent UTIs. Sig. More stable with better controlled. GLC levels. Cont.on glipizide, metformin for DM.  GLC levels 80s-low 100s.  Has memory loss due to dementia. Mood, behaviors gen. Stable. Not currently taking psych meds.       ALLERGIES: Ciprofloxacin; Lisinopril; and Penicillins  Past Medical, Surgical, Family and Social History reviewed and updated in [facility].    Current Outpatient Prescriptions   Medication Sig Dispense Refill     ACETAMINOPHEN PO Take 650 mg by mouth every 4 hours as needed for pain       Cranberry 450 MG CAPS Take by mouth 2 times daily       cyanocobalamin (VITAMIN  B-12) 1000 MCG tablet Take 1,000 mcg by mouth every other day        FINASTERIDE PO Take 5 mg by mouth daily       GLIPIZIDE PO Take 7.5 mg by mouth every morning (before breakfast)       LEVOTHYROXINE SODIUM PO Take 25 mcg by mouth daily        METFORMIN HCL PO Take 1,000 mg by mouth 2 times daily        Psyllium (NATURAL FIBER) 48.57 % POWD Take by mouth daily       tamsulosin (FLOMAX) 0.4 MG capsule Take 0.4 mg by mouth daily        Medications reviewed:  Medications reconciled to facility chart and changes were made to reflect current medications as identified as above med list. Below are the changes that were made:   Medications stopped since last EPIC medication reconciliation:   There are no discontinued medications.    Medications started since last [facility] medication reconciliation:  No orders of the defined types were placed in this  encounter.        REVIEW OF SYSTEMS:  Limited secondary to cognitive impairment but today pt reports feeling ok    Physical Exam:  /65  Pulse 65  Resp 18  SpO2 94%  GENERAL APPEARANCE:  Alert, in no distress, cooperative  ENT:  Mouth and posterior oropharynx normal, moist mucous membranes, [place]  EYES:  EOM, conjunctivae, lids, pupils and irises normal, PERRL  NECK:  No adenopathy,masses or thyromegaly  RESP:  respiratory effort and palpation of chest normal, lungs clear to auscultation , no respiratory distress  CV:  Palpation and auscultation of heart done , regular rate and rhythm, no murmur, rub, or gallop, no edema  ABDOMEN:  normal bowel sounds, soft, nontender, no hepatosplenomegaly or other masses, no guarding or rebound  M/S:   Gait and station normal  muscle strength 5/5 all 4 ext., normal tone  NEURO:   Cranial nerves 2-12 are normal tested and grossly at patient's baseline, alert, speech clear  PSYCH:  insight and judgement impaired, memory impaired , affect and mood normal    Recent Labs:     Last Basic Metabolic Panel:  Recent Labs   Lab Test 02/27/18 09/28/17   NA  136  138   POTASSIUM  4.5  4.2   CHLORIDE  104  103   ELISHA  8.7  9.1   CO2  23  25   BUN  21  31*   CR  1.36*  1.84*   GLC  198*  139*       Liver Function Studies -   Recent Labs   Lab Test 02/27/18 01/08/17   2330   PROTTOTAL   --   7.9   ALBUMIN   --   3.6   BILITOTAL   --   1.0   ALKPHOS   --   458*   AST  24  27   ALT  15  28       TSH   Date Value Ref Range Status   02/27/2018 3.87 0.30 - 5.00 uIU/mL Final   09/12/2017 4.85 0.30 - 5.00 uIU/mL Final       Assessment/Plan:  Dysuria  Currently controlled  1. Cont. Cranberry tabs  2. Cont. flomax  3.monitor for dysuria, increased freq.  4. Encourage fluid intake      Type 2 diabetes mellitus with diabetic nephropathy, without long-term current use of insulin (H)  GLC levels stable  1. Cont. Metformin, glipizide  2. Cont. Every day GLC levels  3. hgb a1c in next 1-3 mos  4. Monitor  for changes in po intake        Dementia without behavioral disturbance, unspecified dementia type  Memory loss. Mood, behaviors stable  1. Cont. Secured memory care unit  2. Monitor for increased confusions  3. Monitor for changes in mod, behaviors, cont. Comfort cares        Electronically signed by  RONY Sanchez CNP

## 2018-11-12 ENCOUNTER — ASSISTED LIVING VISIT (OUTPATIENT)
Dept: GERIATRICS | Facility: CLINIC | Age: 83
End: 2018-11-12
Payer: COMMERCIAL

## 2018-11-12 DIAGNOSIS — F03.90 DEMENTIA WITHOUT BEHAVIORAL DISTURBANCE, UNSPECIFIED DEMENTIA TYPE: ICD-10-CM

## 2018-11-12 DIAGNOSIS — N40.0 BENIGN PROSTATIC HYPERPLASIA WITHOUT LOWER URINARY TRACT SYMPTOMS: ICD-10-CM

## 2018-11-12 DIAGNOSIS — E11.21 TYPE 2 DIABETES MELLITUS WITH DIABETIC NEPHROPATHY, WITHOUT LONG-TERM CURRENT USE OF INSULIN (H): Primary | ICD-10-CM

## 2018-11-12 NOTE — PROGRESS NOTES
[facility]    Chief Complaint   Patient presents with     Diabetes       [place] Medical Record Number:  [id number]  Place of Service where encounter took place:  [facility] [[id number][facility]    HPI:    Farhad Roa is a 89 year old  (8/28/1923), who is being seen today for an episodic care visit.  HPI information obtained from: facility chart records, facility staff, patient report and [facility] chart review.Today's concern is: DM,  BPH, dementia.  For DM cont. On glipizide, mertformin.  GLC levels low to upper 100s with few > 200. Po intake, wt. Stable. No recent dysuria, UTIs since DM better controlled. Cont. On finasteride, flomax for BPH. No recent dysuria or reports of increased freq.  Has memory loss due to alzheimer's.  Mood, behaviors gen. Stable. Not currently taking psych meds.       ALLERGIES: Ciprofloxacin; Lisinopril; and Penicillins  Past Medical, Surgical, Family and Social History reviewed and updated in [facility].    Current Outpatient Prescriptions   Medication Sig Dispense Refill     ACETAMINOPHEN PO Take 650 mg by mouth every 4 hours as needed for pain       Cranberry 450 MG CAPS Take by mouth 2 times daily       cyanocobalamin (VITAMIN  B-12) 1000 MCG tablet Take 1,000 mcg by mouth every other day        FINASTERIDE PO Take 5 mg by mouth daily       GLIPIZIDE PO Take 7.5 mg by mouth every morning (before breakfast)       LEVOTHYROXINE SODIUM PO Take 25 mcg by mouth daily        METFORMIN HCL PO Take 1,000 mg by mouth 2 times daily        Psyllium (NATURAL FIBER) 48.57 % POWD Take by mouth daily       tamsulosin (FLOMAX) 0.4 MG capsule Take 0.4 mg by mouth daily        Medications reviewed:  Medications reconciled to facility chart and changes were made to reflect current medications as identified as above med list. Below are the changes that were made:   Medications stopped since last EPIC medication reconciliation:   There are no discontinued  medications.    Medications started since last [facility] medication reconciliation:  No orders of the defined types were placed in this encounter.        REVIEW OF SYSTEMS:  Limited secondary to cognitive impairment but today pt reports feeling ok    Physical Exam:  /66  Pulse 60  Resp 18  Wt 190 lb (86.2 kg)  SpO2 93%  GENERAL APPEARANCE:  Alert, in no distress, cooperative  ENT:  Mouth and posterior oropharynx normal, moist mucous membranes, [place]  EYES:  EOM, conjunctivae, lids, pupils and irises normal, PERRL  NECK:  No adenopathy,masses or thyromegaly, no carotid bruit  RESP:  respiratory effort and palpation of chest normal, lungs clear to auscultation , no respiratory distress  CV:  Palpation and auscultation of heart done , no edema, rate-normal, grade 2/6 murmur  ABDOMEN:  normal bowel sounds, soft, nontender, no hepatosplenomegaly or other masses, no guarding or rebound  M/S:   Gait and station normal  muscle strength 5/5 all 4 ext, normal tone  NEURO:   Cranial nerves 2-12 are normal tested and grossly at patient's baseline, alert, speech clear  PSYCH:  insight and judgement impaired, memory impaired , affect and mood normal    Recent Labs:     Last Basic Metabolic Panel:  Recent Labs   Lab Test 02/27/18 09/28/17   NA  136  138   POTASSIUM  4.5  4.2   CHLORIDE  104  103   ELISHA  8.7  9.1   CO2  23  25   BUN  21  31*   CR  1.36*  1.84*   GLC  198*  139*       Liver Function Studies -   Recent Labs   Lab Test 02/27/18 01/08/17   2330   PROTTOTAL   --   7.9   ALBUMIN   --   3.6   BILITOTAL   --   1.0   ALKPHOS   --   458*   AST  24  27   ALT  15  28       TSH   Date Value Ref Range Status   02/27/2018 3.87 0.30 - 5.00 uIU/mL Final   09/12/2017 4.85 0.30 - 5.00 uIU/mL Final       Assessment/Plan:  Type 2 diabetes mellitus with diabetic nephropathy, without long-term current use of insulin (H)  Currently controlled  1. Cont. Glipizide, metformin  2. Cont. Bid GLC levels  3. Monitor for changes in po intake,  hypoglycemia  4. hgba1c in next 1-3 mos    Benign prostatic hyperplasia without lower urinary tract symptoms  Gen. Stable. No recent dysuria or increased freq.  1. Cont. Finasteride, flomax  2. Monitor for dysuria  3. Monitor for fever  4. Encourage fluids    Dementia without behavioral disturbance, unspecified dementia type  Memory loss. Moood, behaviors gen. Stable  1. Cont. Secured memory care unit  2. Monitor for decreased po intake, wt. Loss  3. Monitor for changes in mood, behaviors        Electronically signed by  RONY Sanchez CNP

## 2018-11-12 NOTE — LETTER
11/12/2018        RE: Farhad Roa  Care Of Su[facility] Cook  [address] [facility] [address]        [place] [facility]    Chief Complaint   Patient presents with     Diabetes       [place] Medical Record Number:  [id number]  Place of Service where encounter took place:  [facility] [[id number]]    HPI:    Farhad Roa is a 89 year old  (8/28/1923), who is being seen today for an episodic care visit.  HPI information obtained from: facility chart records, facility staff, patient report and [facility] chart review.Today's concern is: DM,  BPH, dementia.  For DM cont. On glipizide, mertformin.  GLC levels low to upper 100s with few > 200. Po intake, wt. Stable. No recent dysuria, UTIs since DM better controlled. Cont. On finasteride, flomax for BPH. No recent dysuria or reports of increased freq.  Has memory loss due to alzheimer's.  Mood, behaviors gen. Stable. Not currently taking psych meds.       ALLERGIES: Ciprofloxacin; Lisinopril; and Penicillins  Past Medical, Surgical, Family and Social History reviewed and updated in [facility].    Current Outpatient Prescriptions   Medication Sig Dispense Refill     ACETAMINOPHEN PO Take 650 mg by mouth every 4 hours as needed for pain       Cranberry 450 MG CAPS Take by mouth 2 times daily       cyanocobalamin (VITAMIN  B-12) 1000 MCG tablet Take 1,000 mcg by mouth every other day        FINASTERIDE PO Take 5 mg by mouth daily       GLIPIZIDE PO Take 7.5 mg by mouth every morning (before breakfast)       LEVOTHYROXINE SODIUM PO Take 25 mcg by mouth daily        METFORMIN HCL PO Take 1,000 mg by mouth 2 times daily        Psyllium (NATURAL FIBER) 48.57 % POWD Take by mouth daily       tamsulosin (FLOMAX) 0.4 MG capsule Take 0.4 mg by mouth daily        Medications reviewed:  Medications reconciled to facility chart and changes were made to reflect current medications as identified as above med list. Below are the changes that were  made:   Medications stopped since last EPIC medication reconciliation:   There are no discontinued medications.    Medications started since last [facility] medication reconciliation:  No orders of the defined types were placed in this encounter.        REVIEW OF SYSTEMS:  Limited secondary to cognitive impairment but today pt reports feeling ok    Physical Exam:  /66  Pulse 60  Resp 18  Wt 190 lb (86.2 kg)  SpO2 93%  GENERAL APPEARANCE:  Alert, in no distress, cooperative  ENT:  Mouth and posterior oropharynx normal, moist mucous membranes, [place]  EYES:  EOM, conjunctivae, lids, pupils and irises normal, PERRL  NECK:  No adenopathy,masses or thyromegaly, no carotid bruit  RESP:  respiratory effort and palpation of chest normal, lungs clear to auscultation , no respiratory distress  CV:  Palpation and auscultation of heart done , no edema, rate-normal, grade 2/6 murmur  ABDOMEN:  normal bowel sounds, soft, nontender, no hepatosplenomegaly or other masses, no guarding or rebound  M/S:   Gait and station normal  muscle strength 5/5 all 4 ext, normal tone  NEURO:   Cranial nerves 2-12 are normal tested and grossly at patient's baseline, alert, speech clear  PSYCH:  insight and judgement impaired, memory impaired , affect and mood normal    Recent Labs:     Last Basic Metabolic Panel:  Recent Labs   Lab Test 02/27/18 09/28/17   NA  136  138   POTASSIUM  4.5  4.2   CHLORIDE  104  103   ELISHA  8.7  9.1   CO2  23  25   BUN  21  31*   CR  1.36*  1.84*   GLC  198*  139*       Liver Function Studies -   Recent Labs   Lab Test 02/27/18 01/08/17   2330   PROTTOTAL   --   7.9   ALBUMIN   --   3.6   BILITOTAL   --   1.0   ALKPHOS   --   458*   AST  24  27   ALT  15  28       TSH   Date Value Ref Range Status   02/27/2018 3.87 0.30 - 5.00 uIU/mL Final   09/12/2017 4.85 0.30 - 5.00 uIU/mL Final       Assessment/Plan:  Type 2 diabetes mellitus with diabetic nephropathy, without long-term current use of insulin (H)  Currently  controlled  1. Cont. Glipizide, metformin  2. Cont. Bid GLC levels  3. Monitor for changes in po intake, hypoglycemia  4. hgba1c in next 1-3 mos    Benign prostatic hyperplasia without lower urinary tract symptoms  Gen. Stable. No recent dysuria or increased freq.  1. Cont. Finasteride, flomax  2. Monitor for dysuria  3. Monitor for fever  4. Encourage fluids    Dementia without behavioral disturbance, unspecified dementia type  Memory loss. Moood, behaviors gen. Stable  1. Cont. Secured memory care unit  2. Monitor for decreased po intake, wt. Loss  3. Monitor for changes in mood, behaviors        Electronically signed by  RONY Sanchez CNP                      Sincerely,        RONY Sanchez CNP

## 2018-11-13 VITALS
WEIGHT: 190 LBS | SYSTOLIC BLOOD PRESSURE: 128 MMHG | RESPIRATION RATE: 18 BRPM | HEART RATE: 60 BPM | OXYGEN SATURATION: 93 % | DIASTOLIC BLOOD PRESSURE: 66 MMHG

## 2019-01-01 ENCOUNTER — ASSISTED LIVING VISIT (OUTPATIENT)
Dept: GERIATRICS | Facility: CLINIC | Age: 84
End: 2019-01-01
Payer: MEDICARE

## 2019-01-01 ENCOUNTER — MEDICAL CORRESPONDENCE (OUTPATIENT)
Dept: HEALTH INFORMATION MANAGEMENT | Facility: CLINIC | Age: 84
End: 2019-01-01

## 2019-01-01 VITALS
RESPIRATION RATE: 16 BRPM | HEART RATE: 75 BPM | OXYGEN SATURATION: 93 % | DIASTOLIC BLOOD PRESSURE: 66 MMHG | SYSTOLIC BLOOD PRESSURE: 123 MMHG

## 2019-01-01 VITALS
DIASTOLIC BLOOD PRESSURE: 68 MMHG | SYSTOLIC BLOOD PRESSURE: 131 MMHG | HEART RATE: 64 BPM | TEMPERATURE: 97.2 F | OXYGEN SATURATION: 97 % | RESPIRATION RATE: 18 BRPM

## 2019-01-01 VITALS
RESPIRATION RATE: 18 BRPM | OXYGEN SATURATION: 91 % | DIASTOLIC BLOOD PRESSURE: 77 MMHG | WEIGHT: 170 LBS | TEMPERATURE: 97.3 F | SYSTOLIC BLOOD PRESSURE: 123 MMHG | BODY MASS INDEX: 24.39 KG/M2

## 2019-01-01 VITALS
DIASTOLIC BLOOD PRESSURE: 84 MMHG | RESPIRATION RATE: 18 BRPM | SYSTOLIC BLOOD PRESSURE: 139 MMHG | HEART RATE: 89 BPM | TEMPERATURE: 97.3 F | OXYGEN SATURATION: 94 %

## 2019-01-01 VITALS — HEART RATE: 80 BPM | OXYGEN SATURATION: 96 % | RESPIRATION RATE: 14 BRPM

## 2019-01-01 VITALS
HEART RATE: 82 BPM | RESPIRATION RATE: 16 BRPM | SYSTOLIC BLOOD PRESSURE: 133 MMHG | DIASTOLIC BLOOD PRESSURE: 77 MMHG | WEIGHT: 173 LBS | TEMPERATURE: 97.2 F

## 2019-01-01 VITALS
WEIGHT: 173 LBS | HEIGHT: 70 IN | HEART RATE: 79 BPM | BODY MASS INDEX: 24.77 KG/M2 | RESPIRATION RATE: 18 BRPM | DIASTOLIC BLOOD PRESSURE: 67 MMHG | OXYGEN SATURATION: 92 % | SYSTOLIC BLOOD PRESSURE: 122 MMHG

## 2019-01-01 VITALS
WEIGHT: 187 LBS | DIASTOLIC BLOOD PRESSURE: 78 MMHG | HEART RATE: 74 BPM | TEMPERATURE: 98.2 F | RESPIRATION RATE: 24 BRPM | SYSTOLIC BLOOD PRESSURE: 126 MMHG

## 2019-01-01 VITALS
DIASTOLIC BLOOD PRESSURE: 67 MMHG | OXYGEN SATURATION: 93 % | RESPIRATION RATE: 18 BRPM | SYSTOLIC BLOOD PRESSURE: 127 MMHG | HEART RATE: 68 BPM

## 2019-01-01 DIAGNOSIS — F02.80 LATE ONSET ALZHEIMER'S DISEASE WITHOUT BEHAVIORAL DISTURBANCE (H): ICD-10-CM

## 2019-01-01 DIAGNOSIS — N40.0 BENIGN PROSTATIC HYPERPLASIA WITHOUT LOWER URINARY TRACT SYMPTOMS: ICD-10-CM

## 2019-01-01 DIAGNOSIS — E11.21 TYPE 2 DIABETES MELLITUS WITH DIABETIC NEPHROPATHY, WITHOUT LONG-TERM CURRENT USE OF INSULIN (H): Primary | ICD-10-CM

## 2019-01-01 DIAGNOSIS — E11.21 TYPE 2 DIABETES MELLITUS WITH DIABETIC NEPHROPATHY, WITHOUT LONG-TERM CURRENT USE OF INSULIN (H): ICD-10-CM

## 2019-01-01 DIAGNOSIS — E03.9 ACQUIRED HYPOTHYROIDISM: ICD-10-CM

## 2019-01-01 DIAGNOSIS — R52 PAIN: Primary | ICD-10-CM

## 2019-01-01 DIAGNOSIS — R07.81 RIB PAIN ON RIGHT SIDE: ICD-10-CM

## 2019-01-01 DIAGNOSIS — Z53.9 DIAGNOSIS NOT YET DEFINED: Primary | ICD-10-CM

## 2019-01-01 DIAGNOSIS — R41.0 CONFUSION: Primary | ICD-10-CM

## 2019-01-01 DIAGNOSIS — I10 ESSENTIAL HYPERTENSION: ICD-10-CM

## 2019-01-01 DIAGNOSIS — M62.81 GENERALIZED MUSCLE WEAKNESS: ICD-10-CM

## 2019-01-01 DIAGNOSIS — R52 PAIN: ICD-10-CM

## 2019-01-01 DIAGNOSIS — S42.351A CLOSED DISPLACED COMMINUTED FRACTURE OF SHAFT OF RIGHT HUMERUS, INITIAL ENCOUNTER: Primary | ICD-10-CM

## 2019-01-01 DIAGNOSIS — G30.1 LATE ONSET ALZHEIMER'S DISEASE WITHOUT BEHAVIORAL DISTURBANCE (H): ICD-10-CM

## 2019-01-01 DIAGNOSIS — R19.7 DIARRHEA, UNSPECIFIED TYPE: ICD-10-CM

## 2019-01-01 DIAGNOSIS — F41.9 ANXIETY: Primary | ICD-10-CM

## 2019-01-01 DIAGNOSIS — R05.9 COUGH: Primary | ICD-10-CM

## 2019-01-01 DIAGNOSIS — L98.9 SKIN LESION OF LEFT LOWER EXTREMITY: Primary | ICD-10-CM

## 2019-01-01 DIAGNOSIS — R29.6 RECURRENT FALLS: ICD-10-CM

## 2019-01-01 PROCEDURE — G0180 MD CERTIFICATION HHA PATIENT: HCPCS | Performed by: INTERNAL MEDICINE

## 2019-01-01 RX ORDER — AMOXICILLIN 250 MG
1 CAPSULE ORAL 2 TIMES DAILY
COMMUNITY
End: 2019-01-01

## 2019-01-01 RX ORDER — QUETIAPINE FUMARATE 25 MG/1
25 TABLET, FILM COATED ORAL 3 TIMES DAILY
COMMUNITY

## 2019-01-01 RX ORDER — TRAZODONE HYDROCHLORIDE 50 MG/1
25 TABLET, FILM COATED ORAL AT BEDTIME
COMMUNITY

## 2019-01-01 RX ORDER — SULFAMETHOXAZOLE AND TRIMETHOPRIM 400; 80 MG/1; MG/1
1 TABLET ORAL 2 TIMES DAILY
COMMUNITY
Start: 2019-01-01 | End: 2019-01-01

## 2019-01-01 RX ORDER — POLYETHYLENE GLYCOL 3350 17 G/17G
17 POWDER, FOR SOLUTION ORAL DAILY
COMMUNITY

## 2019-01-01 RX ORDER — LOPERAMIDE HCL 2 MG
2 CAPSULE ORAL EVERY MORNING
COMMUNITY
End: 2019-01-01

## 2019-01-01 RX ORDER — SODIUM POLYSTYRENE SULFONATE 15 G/60ML
15 SUSPENSION ORAL; RECTAL DAILY
COMMUNITY
End: 2019-01-01

## 2019-01-01 RX ORDER — TRAMADOL HYDROCHLORIDE 50 MG/1
50 TABLET ORAL 2 TIMES DAILY
Status: DISCONTINUED | OUTPATIENT
Start: 2019-01-01 | End: 2019-01-01 | Stop reason: CLARIF

## 2019-01-01 RX ORDER — QUETIAPINE FUMARATE 25 MG/1
25 TABLET, FILM COATED ORAL 3 TIMES DAILY
COMMUNITY
End: 2019-01-01

## 2019-01-01 RX ORDER — TRAMADOL HYDROCHLORIDE 50 MG/1
25 TABLET ORAL 4 TIMES DAILY PRN
Qty: 100 TABLET | Refills: 5 | Status: SHIPPED | OUTPATIENT
Start: 2019-01-01 | End: 2020-01-01 | Stop reason: DRUGHIGH

## 2019-01-01 RX ORDER — TRAMADOL HYDROCHLORIDE 50 MG/1
25 TABLET ORAL 2 TIMES DAILY PRN
Qty: 45 TABLET | Refills: 5 | Status: SHIPPED | OUTPATIENT
Start: 2019-01-01 | End: 2019-01-01 | Stop reason: DRUGHIGH

## 2019-01-01 RX ORDER — TRAMADOL HYDROCHLORIDE 50 MG/1
50 TABLET ORAL DAILY PRN
COMMUNITY
End: 2019-01-01

## 2019-01-01 RX ORDER — BISACODYL 10 MG
10 SUPPOSITORY, RECTAL RECTAL DAILY PRN
COMMUNITY

## 2019-01-01 RX ORDER — TRAMADOL HYDROCHLORIDE 50 MG/1
50 TABLET ORAL
COMMUNITY
Start: 2019-01-01 | End: 2019-01-01

## 2019-01-01 RX ORDER — LOPERAMIDE HCL 2 MG
2 CAPSULE ORAL 3 TIMES DAILY PRN
COMMUNITY
End: 2019-01-01

## 2019-01-01 RX ORDER — TRAMADOL HYDROCHLORIDE 50 MG/1
50 TABLET ORAL 2 TIMES DAILY
Qty: 20 TABLET | Refills: 0 | Status: SHIPPED | OUTPATIENT
Start: 2019-01-01 | End: 2019-01-01

## 2019-01-01 ASSESSMENT — MIFFLIN-ST. JEOR: SCORE: 1420.97

## 2019-01-07 ENCOUNTER — RECORDS - HEALTHEAST (OUTPATIENT)
Dept: LAB | Facility: CLINIC | Age: 84
End: 2019-01-07

## 2019-01-08 ENCOUNTER — TRANSFERRED RECORDS (OUTPATIENT)
Dept: HEALTH INFORMATION MANAGEMENT | Facility: CLINIC | Age: 84
End: 2019-01-08

## 2019-01-08 LAB
ANION GAP SERPL CALCULATED.3IONS-SCNC: 11 MMOL/L (ref 5–18)
ANION GAP SERPL CALCULATED.3IONS-SCNC: 11 MMOL/L (ref 5–18)
BUN SERPL-MCNC: 21 MG/DL (ref 8–28)
BUN SERPL-MCNC: 21 MG/DL (ref 8–28)
CALCIUM SERPL-MCNC: 8.9 MG/DL (ref 8.5–10.5)
CALCIUM SERPL-MCNC: 8.9 MG/DL (ref 8.5–10.5)
CHLORIDE BLD-SCNC: 103 MMOL/L (ref 98–107)
CHLORIDE SERPLBLD-SCNC: 103 MMOL/L (ref 98–107)
CO2 SERPL-SCNC: 23 MMOL/L (ref 22–31)
CO2 SERPL-SCNC: 23 MMOL/L (ref 22–31)
CREAT SERPL-MCNC: 1.49 MG/DL (ref 0.7–1.3)
CREAT SERPL-MCNC: 1.49 MG/DL (ref 0.7–1.3)
GFR SERPL CREATININE-BSD FRML MDRD: 44 ML/MIN/1.73M2
GFR SERPL CREATININE-BSD FRML MDRD: 44 ML/MIN/1.73M2
GLUCOSE BLD-MCNC: 204 MG/DL (ref 70–125)
GLUCOSE SERPL-MCNC: 204 MG/DL (ref 70–125)
HBA1C MFR BLD: 6.9 % (ref 4.2–6.1)
POTASSIUM BLD-SCNC: 4.6 MMOL/L (ref 3.5–5)
POTASSIUM SERPL-SCNC: 4.6 MMOL/L (ref 3.5–5)
SODIUM SERPL-SCNC: 137 MMOL/L (ref 136–145)
SODIUM SERPL-SCNC: 137 MMOL/L (ref 136–145)

## 2019-01-09 LAB — HBA1C MFR BLD: 6.9 % (ref 4.2–6.1)

## 2019-03-18 NOTE — LETTER
3/18/2019        RE: Farhad Roa  [facility]  [address] [facility] [address]        Farhad Roa is a 89 year old male seen March 18, 2019 at [facility] where he has resided for almost 2 years (admit 5/2017) seen to follow up DM2 and dementia.   He is seen in his room, ambulates with FWW.    Pleasant, answers questions appropriately.   Reports he is feeling okay, denies any current pain, dyspnea or other symptoms.   Nursing staff reports patient has a small area on his left hip that will occ bother him.   He always sleeps on his left side.   Has not gotten any bigger, occasionally closes over, but will reopen at times.          Patient had [facility] admission 12/2016 for UTI and fall at home in which he suffered SDH.   He went to [facility], but had another fall and was readmitted to [facility].   He was ultimately transferred here from previous AL for more support.     Patient has had DM2 x10-15 years, secondary nephropathy, CKD stage 3.  Recent blood sugars have been 113-236 on metformin and glipizide.  No recent hypoglycemia.        Records from [facility] not available, but reported to have urachal cyst and partial cystectomy, [facility]  He has not had any recent UTI or  symptoms.          PMH:  DM2, x10+ years   Dementia, late onset  Paget's disease of skull.  Hypothyroidism  H/o SDH, 1/2017  Hearing loss  BPH  H/o urachal cyst  S/p partial cystectomy  Recurrent UTIs       SH:  .  Previously lived at the [facility]  [facility].       ROS:  occ headache, no other pain     Wt Readings from Last 5 Encounters:   03/18/19 84.8 kg (187 lb)   11/12/18 86.2 kg (190 lb)   03/12/18 83.5 kg (184 lb)   09/11/17 84.8 kg (187 lb)   01/03/17 85.8 kg (189 lb 3.2 oz)     EXAM:  Pleasant, NAD  /78   Pulse 74   Temp 98.2  F (36.8  C)   Resp 24   Wt 84.8 kg (187 lb)    Neck supple without adenopathy  Lungs clear bilaterally with fair air  movement.     Heart RRR s1s2, 2/6 JESSICA  Abd soft, NT, no distention, +BS  Left lateral hip area has 1-2 cm dry scabbed over lesion, no erythema or drainage, NT.   Does not look to be in an obvious pressure area.      Ext without edema  Neuro: limited history, no focal findings.   Psych: affect okay    Last Comprehensive Metabolic Panel:  Sodium   Date Value Ref Range Status   01/08/2019 137 136 - 145 mmol/L Final     Potassium   Date Value Ref Range Status   01/08/2019 4.6 3.5 - 5.0 mmol/L Final     Chloride   Date Value Ref Range Status   01/08/2019 103 98 - 107 mmol/L Final     Carbon Dioxide   Date Value Ref Range Status   01/08/2019 23 22 - 31 mmol/L Final     Anion Gap   Date Value Ref Range Status   01/08/2019 11 5 - 18 mmol/L Final     Glucose   Date Value Ref Range Status   01/08/2019 204 (H) 70 - 125 mg/dL Final     Urea Nitrogen   Date Value Ref Range Status   01/08/2019 21 8 - 28 mg/dL Final     Creatinine   Date Value Ref Range Status   01/08/2019 1.49 (H) 0.70 - 1.30 mg/dL Final     GFR Estimate   Date Value Ref Range Status   01/08/2019 44 (L) >60 ml/min/1.73m2 Final     Calcium   Date Value Ref Range Status   01/08/2019 8.9 8.5 - 10.5 mg/dL Final         IMP/PLAN:  (L98.9) Skin lesion of left lower extremity  (primary encounter diagnosis)  Comment: recurrent, etiology not clear, ?may be pressure since he preferably lies on his left side.     Plan: reviewed with nursing staff; will follow at this point, local measures as needed.         (N39.0) Recurrent urinary tract infection  Comment: complicated  history, but decreased infections since improved DM control.     Plan: continue cranberry tabs, follow     Would need Urologic eval for more definitive tx if recurrent; however family disinclined to aggressive tx or catheter placement.       (N40.0) Benign prostatic hyperplasia without lower urinary tract symptoms  Comment: no current symptoms, remains incontinent   Plan: continue tamsulosin and  finasteride to avoid urinary retention.       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   He had significant polyuria and recurrent UTIs before glipizide was restarted, so would continue that unless he starts to have hypoglycemia.     He is not on statin or daily ASA due to age and goals of care.     (E03.9) Acquired hypothyroidism  Comment:   TSH   Date Value Ref Range Status   02/27/2018 3.87 0.30 - 5.00 uIU/mL Final    Plan: yearly TSH       (G30.1,  F02.80) Late onset Alzheimer's disease without behavioral disturbance  Comment: with disorientation, low functional status; no behavioral symptoms to date  Plan:    [facility] for support with meds, meals, cares, activity.   Needs cuing and redirection    Reviewed medications, no changes today.     Heidy Melton MD           Sincerely,        Heidy Melton MD

## 2019-03-18 NOTE — PROGRESS NOTES
Farhad Roa is a 89 year old male seen March 18, 2019 at [facility] where he has resided for almost 2 years (admit 5/2017) seen to follow up DM2 and dementia.   He is seen in his room, ambulates with FWW.    Pleasant, answers questions appropriately.   Reports he is feeling okay, denies any current pain, dyspnea or other symptoms.   Nursing staff reports patient has a small area on his left hip that will occ bother him.   He always sleeps on his left side.   Has not gotten any bigger, occasionally closes over, but will reopen at times.          Patient had [facility] admission 12/2016 for UTI and fall at home in which he suffered SDH.   He went to [facility], but had another fall and was readmitted to [facility].   He was ultimately transferred here from previous AL for more support.     Patient has had DM2 x10-15 years, secondary nephropathy, CKD stage 3.  Recent blood sugars have been 113-236 on metformin and glipizide.  No recent hypoglycemia.        Records from [facility] not available, but reported to have urachal cyst and partial cystectomy, [facility].  He has not had any recent UTI or  symptoms.          PMH:  DM2, x10+ years   Dementia, late onset  Paget's disease of skull.  Hypothyroidism  H/o SDH, 1/2017  Hearing loss  BPH  H/o urachal cyst  S/p partial cystectomy  Recurrent UTIs       SH:  .  Previously lived at the [facility]  [facility].       ROS:  occ headache, no other pain     Wt Readings from Last 5 Encounters:   03/18/19 84.8 kg (187 lb)   11/12/18 86.2 kg (190 lb)   03/12/18 83.5 kg (184 lb)   09/11/17 84.8 kg (187 lb)   01/03/17 85.8 kg (189 lb 3.2 oz)     EXAM:  Pleasant, NAD  /78   Pulse 74   Temp 98.2  F (36.8  C)   Resp 24   Wt 84.8 kg (187 lb)    Neck supple without adenopathy  Lungs clear bilaterally with fair air movement.     Heart RRR s1s2, 2/6 JESSICA  Abd soft, NT, no distention, +BS  Left lateral hip area has 1-2 cm dry  scabbed over lesion, no erythema or drainage, NT.   Does not look to be in an obvious pressure area.      Ext without edema  Neuro: limited history, no focal findings.   Psych: affect okay    Last Comprehensive Metabolic Panel:  Sodium   Date Value Ref Range Status   01/08/2019 137 136 - 145 mmol/L Final     Potassium   Date Value Ref Range Status   01/08/2019 4.6 3.5 - 5.0 mmol/L Final     Chloride   Date Value Ref Range Status   01/08/2019 103 98 - 107 mmol/L Final     Carbon Dioxide   Date Value Ref Range Status   01/08/2019 23 22 - 31 mmol/L Final     Anion Gap   Date Value Ref Range Status   01/08/2019 11 5 - 18 mmol/L Final     Glucose   Date Value Ref Range Status   01/08/2019 204 (H) 70 - 125 mg/dL Final     Urea Nitrogen   Date Value Ref Range Status   01/08/2019 21 8 - 28 mg/dL Final     Creatinine   Date Value Ref Range Status   01/08/2019 1.49 (H) 0.70 - 1.30 mg/dL Final     GFR Estimate   Date Value Ref Range Status   01/08/2019 44 (L) >60 ml/min/1.73m2 Final     Calcium   Date Value Ref Range Status   01/08/2019 8.9 8.5 - 10.5 mg/dL Final         IMP/PLAN:  (L98.9) Skin lesion of left lower extremity  (primary encounter diagnosis)  Comment: recurrent, etiology not clear, ?may be pressure since he preferably lies on his left side.     Plan: reviewed with nursing staff; will follow at this point, local measures as needed.         (N39.0) Recurrent urinary tract infection  Comment: complicated  history, but decreased infections since improved DM control.     Plan: continue cranberry tabs, follow     Would need Urologic eval for more definitive tx if recurrent; however family disinclined to aggressive tx or catheter placement.       (N40.0) Benign prostatic hyperplasia without lower urinary tract symptoms  Comment: no current symptoms, remains incontinent   Plan: continue tamsulosin and finasteride to avoid urinary retention.       (E11.21) Type 2 diabetes mellitus with diabetic nephropathy, without  long-term current use of insulin (H)  Comment:   Lab Results   Component Value Date    A1C 6.9 01/08/2019     Plan: continue metformin bid and daily glipizide, recheck A1C.   He had significant polyuria and recurrent UTIs before glipizide was restarted, so would continue that unless he starts to have hypoglycemia.     He is not on statin or daily ASA due to age and goals of care.     (E03.9) Acquired hypothyroidism  Comment:   TSH   Date Value Ref Range Status   02/27/2018 3.87 0.30 - 5.00 uIU/mL Final    Plan: yearly TSH       (G30.1,  F02.80) Late onset Alzheimer's disease without behavioral disturbance  Comment: with disorientation, low functional status; no behavioral symptoms to date  Plan:    [facility] for support with meds, meals, cares, activity.   Needs cuing and redirection    Reviewed medications, no changes today.     Heidy Melton MD

## 2019-04-01 NOTE — PROGRESS NOTES
[facility]  Chief Complaint   Patient presents with     Annual Comprehensive Exam Assisted Living     [facility] Record Number:  [id number]  Place of Service where encounter took place:  [facility] [[id number]]    HPI:    Farhad Roa  is a 89 year old  (8/28/1923), who is being seen today for an annual comprehensive visit. HPI information obtained from: facility chart records, facility staff, patient report, [facility] chart review and family/first contact dtr report.  Today's concerns are:  Recurrent falls  S/p fall 3/27/19, 3/30/19. Appear mechanical in nature, does not fully put on shoes at times. Has subsequent R rib pain. No bruising. Pain with coughing. Cont. On tylenol for pain. Is amb. Per usual.    Type 2 diabetes mellitus with diabetic nephropathy, without long-term current use of insulin (H)  GLC levels stable. 100s-low 200s at times. Po intake stable. Cont. On metformin, glipizide    Essential hypertension  BP goals are  <140/90 mm Hg.This is higher than ACC and AHA recommendations due to risk of dizziness and falls. Patient is stable and continue without pharmacological invention with routine assessment. Not currently taking BP meds. Fluid intake adequate.         ALLERGIES: Ciprofloxacin; Lisinopril; and Penicillins  PAST MEDICAL HISTORY:  has a past medical history of Cancer (H), Depressive disorder, Diabetes (H), Hypertension, and Thyroid disease.  PAST SURGICAL HISTORY:  has no past surgical history on file.  IMMUNIZATIONS:  Immunization History   Administered Date(s) Administered     Influenza (High Dose) 3 valent vaccine 09/14/2012, 11/10/2017, 10/26/2018     Zoster vaccine, live 05/09/2011     Above immunizations pulled from [facility]. [facility] and facility records also reconciled. Outstanding information sent to  to update [facility].  Future immunizations needed:  PPSV23 and PCV13    Current Outpatient Medications   Medication  Sig Dispense Refill     ACETAMINOPHEN PO Take 650 mg by mouth every 4 hours as needed for pain       Cranberry 450 MG CAPS Take by mouth 2 times daily       cyanocobalamin (VITAMIN  B-12) 1000 MCG tablet Take 1,000 mcg by mouth every other day        FINASTERIDE PO Take 5 mg by mouth daily       GLIPIZIDE PO Take 7.5 mg by mouth every morning (before breakfast)       LEVOTHYROXINE SODIUM PO Take 25 mcg by mouth daily        METFORMIN HCL PO Take 1,000 mg by mouth 2 times daily        Psyllium (NATURAL FIBER) 48.57 % POWD Take by mouth daily       tamsulosin (FLOMAX) 0.4 MG capsule Take 0.4 mg by mouth daily        traMADol (ULTRAM) 50 MG tablet Take 50 mg by mouth 2 times daily And every day prn         Case Management:  I have reviewed the Assisted Living care plan, current immunizations and preventive care/cancer screening. .Future cancer screening is not clinically indicated secondary to age/goals of care Patient's desire to return to the community is not assessible due to cognitive impairment. Current Level of Care is appropriate.    Advance Directive Discussion:    I reviewed the current advanced directives as reflected in EPIC, the POLST and the facility chart, and verified the congruency of orders 4/1/19. I contacted the first party dtr and discussed the plan of Care.  I did not due to cognitive impairment review the advance directives with the resident.     Team Discussion:  I communicated with the appropriate disciplines involved with the Plan of Care:   Nursing    Patient's goal is unobtainable secondary to cognitive impairment.  Information reviewed:  Medications, vital signs, orders, and nursing notes.    ROS:  Unobtainable secondary to cognitive impairment. Today reports R rib pain    Vitals:  /84   Pulse 89   Temp 97.3  F (36.3  C)   Resp 18   SpO2 94%  There is no height or weight on file to calculate BMI.  Exam:  GENERAL APPEARANCE:  Alert, in no distress, cooperative  ENT:  Mouth and  posterior oropharynx normal, moist mucous membranes, [place]  EYES:  EOM, conjunctivae, lids, pupils and irises normal, PERRL  NECK:  No adenopathy,masses or thyromegaly, FROM  RESP:  respiratory effort and palpation of chest normal, lungs clear to auscultation , no respiratory distress  CV:  Palpation and auscultation of heart done , regular rate and rhythm, no murmur, rub, or gallop, no edema  ABDOMEN:  normal bowel sounds, soft, nontender, no hepatosplenomegaly or other masses, no guarding or rebound  M/S:   requires assist to stand. gait steady with SBA, occ balance issues. pain with palp. of R flank. no pain with ROM of extremities   SKIN:  Inspection of skin and subcutaneous tissue baseline, Palpation of skin and subcutaneous tissue baseline, no bruising over R flank  NEURO:   Cranial nerves 2-12 are normal tested and grossly at patient's baseline, alert, speech clear  PSYCH:  insight and judgement impaired, memory impaired , affect and mood normal     Lab/Diagnostic data:     Most Recent 3 CBC's:  Recent Labs   Lab Test 09/12/17 01/08/17  2330   WBC 10.4 11.9*   HGB 13.2* 14.5   * 99    256     Most Recent 3 BMP's:  Recent Labs   Lab Test 01/08/19 02/27/18 09/28/17    136 138   POTASSIUM 4.6 4.5 4.2   CHLORIDE 103 104 103   CO2 23 23 25   BUN 21 21 31*   CR 1.49* 1.36* 1.84*   ANIONGAP 11 9 10   ELISHA 8.9 8.7 9.1   * 198* 139*     Most Recent Hemoglobin A1c:  Recent Labs   Lab Test 01/08/19   A1C 6.9*       ASSESSMENT/PLAN  Recurrent falls  S/p falls x 2 . R rib pain  1. Cont. Tylenol  2. Start sched. Prn ultram x 7 days  3. Refer to PT  4. dtr to purchase slip on shoes  5. Monitor for further falls  6. Instructed to use support of pillow to R flank for pain with coughing     Type 2 diabetes mellitus with diabetic nephropathy, without long-term current use of insulin (H)  Currently controlled  1. Cont. Metformin, glipizide  2. Cont. Every day GLC levels, alt times  3. Monitor for s/s UTI,  cont. flomax  4. hgba1c in next 1-3 mos    Essential hypertension  Currently controlled  1. Follow BPs, HRs  2. Encourage fluids  3. Monitor for dizziness      Electronically signed by:  RONY Sanchez CNP         Documentation of Face-to-Face and Certification for Home Health Services     Patient: Farhad Roa   YOB: 1923  MR Number: [id number]  Today's Date: 4/1/2019    I certify that patient: Farhad Roa is under my care and that I, or a nurse practitioner or physician's assistant working with me, had a face-to-face encounter that meets the physician face-to-face encounter requirements with this patient on: 4/1/2019.    This encounter with the patient was in whole, or in part, for the following medical condition, which is the primary reason for home health care: LE weakness.    I certify that, based on my findings, the following services are medically necessary home health services: Physical Therapy.    My clinical findings support the need for the above services because: Physical Therapy Services are needed to assess and treat the following functional impairments: LE weakness.    Further, I certify that my clinical findings support that this patient is homebound (i.e. absences from home require considerable and taxing effort and are for medical reasons or Religion services or infrequently or of short duration when for other reasons) because: Requires assistance of another person or specialized equipment to access medical services because patient:  LE weakness, dementia..    Based on the above findings. I certify that this patient is confined to the home and needs intermittent skilled nursing care, physical therapy and/or speech therapy.  The patient is under my care, and I have initiated the establishment of the plan of care.  This patient will be followed by a physician who will periodically review the plan of care.  Physician/Provider to provide follow up care: Ksenia  Tay Faust    Responsible Medicare certified PECOS Physician:   Physician Signature: See electronic signature associated with these discharge orders.  Date: 4/1/2019

## 2019-04-01 NOTE — LETTER
4/1/2019        RE: Farhad Roa  [facility]  [address] [facility] [address]        [place] [facility]  Chief Complaint   Patient presents with     Annual Comprehensive Exam Assisted Living     [place] Medical Record Number:  [id number]  Place of Service where encounter took place:  [facility] [place] [facility] [[id number]]    HPI:    Farhad Roa  is a 89 year old  (8/28/1923), who is being seen today for an annual comprehensive visit. HPI information obtained from: facility chart records, facility staff, patient report, [facility] chart review and family/first contact dtr report.  Today's concerns are:  Recurrent falls  S/p fall 3/27/19, 3/30/19. Appear mechanical in nature, does not fully put on shoes at times. Has subsequent R rib pain. No bruising. Pain with coughing. Cont. On tylenol for pain. Is amb. Per usual.    Type 2 diabetes mellitus with diabetic nephropathy, without long-term current use of insulin (H)  GLC levels stable. 100s-low 200s at times. Po intake stable. Cont. On metformin, glipizide    Essential hypertension  BP goals are  <140/90 mm Hg.This is higher than ACC and AHA recommendations due to risk of dizziness and falls. Patient is stable and continue without pharmacological invention with routine assessment. Not currently taking BP meds. Fluid intake adequate.         ALLERGIES: Ciprofloxacin; Lisinopril; and Penicillins  PAST MEDICAL HISTORY:  has a past medical history of Cancer (H), Depressive disorder, Diabetes (H), Hypertension, and Thyroid disease.  PAST SURGICAL HISTORY:  has no past surgical history on file.  IMMUNIZATIONS:  Immunization History   Administered Date(s) Administered     Influenza (High Dose) 3 valent vaccine 09/14/2012, 11/10/2017, 10/26/2018     Zoster vaccine, live 05/09/2011     Above immunizations pulled from [facility]. [facility] and facility records also reconciled. Outstanding information sent to  to  update [facility].  Future immunizations needed:  PPSV23 and PCV13    Current Outpatient Medications   Medication Sig Dispense Refill     ACETAMINOPHEN PO Take 650 mg by mouth every 4 hours as needed for pain       Cranberry 450 MG CAPS Take by mouth 2 times daily       cyanocobalamin (VITAMIN  B-12) 1000 MCG tablet Take 1,000 mcg by mouth every other day        FINASTERIDE PO Take 5 mg by mouth daily       GLIPIZIDE PO Take 7.5 mg by mouth every morning (before breakfast)       LEVOTHYROXINE SODIUM PO Take 25 mcg by mouth daily        METFORMIN HCL PO Take 1,000 mg by mouth 2 times daily        Psyllium (NATURAL FIBER) 48.57 % POWD Take by mouth daily       tamsulosin (FLOMAX) 0.4 MG capsule Take 0.4 mg by mouth daily        traMADol (ULTRAM) 50 MG tablet Take 50 mg by mouth 2 times daily And every day prn         Case Management:  I have reviewed the Assisted Living care plan, current immunizations and preventive care/cancer screening. .Future cancer screening is not clinically indicated secondary to age/goals of care Patient's desire to return to the community is not assessible due to cognitive impairment. Current Level of Care is appropriate.    Advance Directive Discussion:    I reviewed the current advanced directives as reflected in EPIC, the POLST and the facility chart, and verified the congruency of orders 4/1/19. I contacted the first party dtr and discussed the plan of Care.  I did not due to cognitive impairment review the advance directives with the resident.     Team Discussion:  I communicated with the appropriate disciplines involved with the Plan of Care:   Nursing    Patient's goal is unobtainable secondary to cognitive impairment.  Information reviewed:  Medications, vital signs, orders, and nursing notes.    ROS:  Unobtainable secondary to cognitive impairment. Today reports R rib pain    Vitals:  /84   Pulse 89   Temp 97.3  F (36.3  C)   Resp 18   SpO2 94%  There is no height or  weight on file to calculate BMI.  Exam:  GENERAL APPEARANCE:  Alert, in no distress, cooperative  ENT:  Mouth and posterior oropharynx normal, moist mucous membranes, [place]  EYES:  EOM, conjunctivae, lids, pupils and irises normal, PERRL  NECK:  No adenopathy,masses or thyromegaly, FROM  RESP:  respiratory effort and palpation of chest normal, lungs clear to auscultation , no respiratory distress  CV:  Palpation and auscultation of heart done , regular rate and rhythm, no murmur, rub, or gallop, no edema  ABDOMEN:  normal bowel sounds, soft, nontender, no hepatosplenomegaly or other masses, no guarding or rebound  M/S:   requires assist to stand. gait steady with SBA, occ balance issues. pain with palp. of R flank. no pain with ROM of extremities   SKIN:  Inspection of skin and subcutaneous tissue baseline, Palpation of skin and subcutaneous tissue baseline, no bruising over R flank  NEURO:   Cranial nerves 2-12 are normal tested and grossly at patient's baseline, alert, speech clear  PSYCH:  insight and judgement impaired, memory impaired , affect and mood normal     Lab/Diagnostic data:     Most Recent 3 CBC's:  Recent Labs   Lab Test 09/12/17 01/08/17  2330   WBC 10.4 11.9*   HGB 13.2* 14.5   * 99    256     Most Recent 3 BMP's:  Recent Labs   Lab Test 01/08/19 02/27/18 09/28/17    136 138   POTASSIUM 4.6 4.5 4.2   CHLORIDE 103 104 103   CO2 23 23 25   BUN 21 21 31*   CR 1.49* 1.36* 1.84*   ANIONGAP 11 9 10   ELISHA 8.9 8.7 9.1   * 198* 139*     Most Recent Hemoglobin A1c:  Recent Labs   Lab Test 01/08/19   A1C 6.9*       ASSESSMENT/PLAN  Recurrent falls  S/p falls x 2 . R rib pain  1. Cont. Tylenol  2. Start sched. Prn ultram x 7 days  3. Refer to PT  4. dtr to purchase slip on shoes  5. Monitor for further falls  6. Instructed to use support of pillow to R flank for pain with coughing     Type 2 diabetes mellitus with diabetic nephropathy, without long-term current use of insulin  (H)  Currently controlled  1. Cont. Metformin, glipizide  2. Cont. Every day GLC levels, alt times  3. Monitor for s/s UTI, cont. flomax  4. hgba1c in next 1-3 mos    Essential hypertension  Currently controlled  1. Follow BPs, HRs  2. Encourage fluids  3. Monitor for dizziness      Electronically signed by:  RONY Sanchez CNP         Documentation of Face-to-Face and Certification for Home Health Services     Patient: Farhad Roa   YOB: 1923  MR Number: [id number]  Today's Date: 4/1/2019    I certify that patient: Farhad Roa is under my care and that I, or a nurse practitioner or physician's assistant working with me, had a face-to-face encounter that meets the physician face-to-face encounter requirements with this patient on: 4/1/2019.    This encounter with the patient was in whole, or in part, for the following medical condition, which is the primary reason for home health care: LE weakness.    I certify that, based on my findings, the following services are medically necessary home health services: Physical Therapy.    My clinical findings support the need for the above services because: Physical Therapy Services are needed to assess and treat the following functional impairments: LE weakness.    Further, I certify that my clinical findings support that this patient is homebound (i.e. absences from home require considerable and taxing effort and are for medical reasons or Church services or infrequently or of short duration when for other reasons) because: Requires assistance of another person or specialized equipment to access medical services because patient:  LE weakness, dementia..    Based on the above findings. I certify that this patient is confined to the home and needs intermittent skilled nursing care, physical therapy and/or speech therapy.  The patient is under my care, and I have initiated the establishment of the plan of care.  This patient will be  followed by a physician who will periodically review the plan of care.  Physician/Provider to provide follow up care: Tay Mccormack    Responsible Medicare certified PECOS Physician:   Physician Signature: See electronic signature associated with these discharge orders.  Date: 4/1/2019        Sincerely,        RONY Sanchez CNP

## 2019-04-04 NOTE — LETTER
4/4/2019        RE: Farhad Roa  [facility]  [address] [facility] [address]        [place] [facility]  [place] Medical Record Number:  [id number]  Place of Service where encounter took place:  [facility] [[id number]]  Chief Complaint   Patient presents with     Cough       HPI:    Farhad Roa  is a 89 year old (8/28/1923), who is being seen today for an episodic care visit.  HPI information obtained from: facility chart records, facility staff, patient report, [facility] chart review and family/first contact dtr report. Today's concern is:cough, R rib pain, weakness. S/p recent falls x 2, 3/27/19, 3/30/19. Appeared mechanical in nature. Reported R rib pain. No bruising to area. dtr wanted pain med other than tylenol started. Started on ultram earlier this week. Has increased pain with cough which has been persistent. Afebrile. O2 sats stable. HC RN visit yesterday reported crackles LLL. Reports occ CASTILLO. Has had subsequent decond. Decreased act. Level. PT started. Per staff, more sleepy during day since ultram started.       Past Medical and Surgical History reviewed in Epic today.    MEDICATIONS:  Current Outpatient Medications   Medication Sig Dispense Refill     ACETAMINOPHEN PO Take 650 mg by mouth every 4 hours as needed for pain       Cranberry 450 MG CAPS Take by mouth 2 times daily       cyanocobalamin (VITAMIN  B-12) 1000 MCG tablet Take 1,000 mcg by mouth every other day        FINASTERIDE PO Take 5 mg by mouth daily       GLIPIZIDE PO Take 7.5 mg by mouth every morning (before breakfast)       LEVOTHYROXINE SODIUM PO Take 25 mcg by mouth daily        METFORMIN HCL PO Take 1,000 mg by mouth 2 times daily        Psyllium (NATURAL FIBER) 48.57 % POWD Take by mouth daily       tamsulosin (FLOMAX) 0.4 MG capsule Take 0.4 mg by mouth daily        traMADol (ULTRAM) 50 MG tablet Take 50 mg by mouth 2 times daily And every day prn       traMADol  (ULTRAM) 50 MG tablet Take 1 tablet (50 mg) by mouth 2 times daily for 7 days And 50 mg every day prn 20 tablet 0         REVIEW OF SYSTEMS:  Unobtainable secondary to cognitive impairment. Reports cough, ongoing R rib pain    Objective:  /68   Pulse 64   Temp 97.2  F (36.2  C)   Resp 18   SpO2 97%   Exam:  GENERAL APPEARANCE:  Alert, in no distress, cooperative  ENT:  Mouth and posterior oropharynx normal, moist mucous membranes, [place]  EYES:  EOM, conjunctivae, lids, pupils and irises normal, PERRL  NECK:  No adenopathy,masses or thyromegaly, FROM  RESP:  respiratory effort and palpation of chest normal, lungs clear to auscultation , no respiratory distress, diminished breath sounds bibasilar. cough with deep inspiration  CV:  Palpation and auscultation of heart done , regular rate and rhythm, no murmur, rub, or gallop, no edema  ABDOMEN:  normal bowel sounds, soft, nontender, no hepatosplenomegaly or other masses, no guarding or rebound  M/S:   gen. LE weakness. pain with palp. R flank.  no pain with PROM LEs  SKIN:  healing skin tear L forearm. no bruising to R flank  NEURO:   Cranial nerves 2-12 are normal tested and grossly at patient's baseline, speech clear  PSYCH:  insight and judgement impaired, memory impaired , affect and mood normal    Labs:     Most Recent 3 CBC's:  Recent Labs   Lab Test 09/12/17 01/08/17  2330   WBC 10.4 11.9*   HGB 13.2* 14.5   * 99    256     Most Recent 3 BMP's:  Recent Labs   Lab Test 01/08/19 02/27/18 09/28/17    136 138   POTASSIUM 4.6 4.5 4.2   CHLORIDE 103 104 103   CO2 23 23 25   BUN 21 21 31*   CR 1.49* 1.36* 1.84*   ANIONGAP 11 9 10   ELISHA 8.9 8.7 9.1   * 198* 139*     Most Recent Hemoglobin A1c:  Recent Labs   Lab Test 01/08/19   A1C 6.9*       ASSESSMENT/PLAN:  Cough  Ongoing. O2 sats stable. Pain with deep inspiration, decreased act. Level  1. CXR  2. Follow O2 sats  3. Monitor for increased CASTILLO  4. Cont. mucinex    Rib pain on right  side  Ongoing. S/p fall. No bruising to area. Started on ultram. Decreased mobility  1. Cont. Ultram for next few days, then discontinue  2. Cont. Tylenol  3. CXR as above  4. Instructed to cont. To splint R side with pillow when coughing    Generalized muscle weakness  Ongoing, decond.   1. Address pain as above  2. Cont. PT  3. Increased act. Level as jose      Electronically signed by:  RONY Sanchez CNP             Sincerely,        RONY Sanchez CNP

## 2019-04-04 NOTE — PROGRESS NOTES
[facility]  [place] Medical Record Number:  [id number]  Place of Service where encounter took place:  [facility] [[id number]]  Chief Complaint   Patient presents with     Cough       HPI:    Farhad Roa  is a 89 year old (8/28/1923), who is being seen today for an episodic care visit.  HPI information obtained from: facility chart records, facility staff, patient report, [facility] chart review and family/first contact dtr report. Today's concern is:cough, R rib pain, weakness. S/p recent falls x 2, 3/27/19, 3/30/19. Appeared mechanical in nature. Reported R rib pain. No bruising to area. dtr wanted pain med other than tylenol started. Started on ultram earlier this week. Has increased pain with cough which has been persistent. Afebrile. O2 sats stable. HC RN visit yesterday reported crackles LLL. Reports occ CASTILLO. Has had subsequent decond. Decreased act. Level. PT started. Per staff, more sleepy during day since ultram started.       Past Medical and Surgical History reviewed in Epic today.    MEDICATIONS:  Current Outpatient Medications   Medication Sig Dispense Refill     ACETAMINOPHEN PO Take 650 mg by mouth every 4 hours as needed for pain       Cranberry 450 MG CAPS Take by mouth 2 times daily       cyanocobalamin (VITAMIN  B-12) 1000 MCG tablet Take 1,000 mcg by mouth every other day        FINASTERIDE PO Take 5 mg by mouth daily       GLIPIZIDE PO Take 7.5 mg by mouth every morning (before breakfast)       LEVOTHYROXINE SODIUM PO Take 25 mcg by mouth daily        METFORMIN HCL PO Take 1,000 mg by mouth 2 times daily        Psyllium (NATURAL FIBER) 48.57 % POWD Take by mouth daily       tamsulosin (FLOMAX) 0.4 MG capsule Take 0.4 mg by mouth daily        traMADol (ULTRAM) 50 MG tablet Take 50 mg by mouth 2 times daily And every day prn       traMADol (ULTRAM) 50 MG tablet Take 1 tablet (50 mg) by mouth 2 times daily for 7 days And 50 mg every day prn 20 tablet 0          REVIEW OF SYSTEMS:  Unobtainable secondary to cognitive impairment. Reports cough, ongoing R rib pain    Objective:  /68   Pulse 64   Temp 97.2  F (36.2  C)   Resp 18   SpO2 97%   Exam:  GENERAL APPEARANCE:  Alert, in no distress, cooperative  ENT:  Mouth and posterior oropharynx normal, moist mucous membranes, [place]  EYES:  EOM, conjunctivae, lids, pupils and irises normal, PERRL  NECK:  No adenopathy,masses or thyromegaly, FROM  RESP:  respiratory effort and palpation of chest normal, lungs clear to auscultation , no respiratory distress, diminished breath sounds bibasilar. cough with deep inspiration  CV:  Palpation and auscultation of heart done , regular rate and rhythm, no murmur, rub, or gallop, no edema  ABDOMEN:  normal bowel sounds, soft, nontender, no hepatosplenomegaly or other masses, no guarding or rebound  M/S:   gen. LE weakness. pain with palp. R flank.  no pain with PROM LEs  SKIN:  healing skin tear L forearm. no bruising to R flank  NEURO:   Cranial nerves 2-12 are normal tested and grossly at patient's baseline, speech clear  PSYCH:  insight and judgement impaired, memory impaired , affect and mood normal    Labs:     Most Recent 3 CBC's:  Recent Labs   Lab Test 09/12/17 01/08/17  2330   WBC 10.4 11.9*   HGB 13.2* 14.5   * 99    256     Most Recent 3 BMP's:  Recent Labs   Lab Test 01/08/19 02/27/18 09/28/17    136 138   POTASSIUM 4.6 4.5 4.2   CHLORIDE 103 104 103   CO2 23 23 25   BUN 21 21 31*   CR 1.49* 1.36* 1.84*   ANIONGAP 11 9 10   ELISHA 8.9 8.7 9.1   * 198* 139*     Most Recent Hemoglobin A1c:  Recent Labs   Lab Test 01/08/19   A1C 6.9*       ASSESSMENT/PLAN:  Cough  Ongoing. O2 sats stable. Pain with deep inspiration, decreased act. Level  1. CXR  2. Follow O2 sats  3. Monitor for increased CASTILLO  4. Cont. mucinex    Rib pain on right side  Ongoing. S/p fall. No bruising to area. Started on ultram. Decreased mobility  1. Cont. Ultram for next few  days, then discontinue  2. Cont. Tylenol  3. CXR as above  4. Instructed to cont. To splint R side with pillow when coughing    Generalized muscle weakness  Ongoing, decond.   1. Address pain as above  2. Cont. PT  3. Increased act. Level as jose      Electronically signed by:  RONY Sanchez CNP

## 2019-07-24 NOTE — LETTER
7/24/2019        RE: Farhad Roa  [facility]  [address] [facility] [address]        [place] [facility]  [place] Medical Record Number:  [id number]  Place of Service where encounter took place:  [facility] [place] [facility] [[id number]]  Chief Complaint   Patient presents with     Diabetes       HPI:    Farhad Roa  is a 89 year old (8/28/1923), who is being seen today for an episodic care visit.  HPI information obtained from: facility chart records, facility staff, patient report, [facility] chart review and family/first contact dtr report. Today's concern is: DM, weakness, alzheimer's.  For DM cont. On glipizide, metformin. Per dtr had episode of staring forward, no responding as quickly to questions.  No other focal neurological deficits reported.  dtr feels resident has had some increased gen weakness at times. Gait slowed, some increased difficulty standing from seated position.  No recent falls. No recent fever. Has memory loss due to alzheimer's.  Mood, behaviors gen. Stable.       Past Medical and Surgical History reviewed in Epic today.    MEDICATIONS:  Current Outpatient Medications   Medication Sig Dispense Refill     ACETAMINOPHEN PO Take 650 mg by mouth every 4 hours as needed for pain       Cranberry 450 MG CAPS Take by mouth 2 times daily       cyanocobalamin (VITAMIN  B-12) 1000 MCG tablet Take 1,000 mcg by mouth every other day        FINASTERIDE PO Take 5 mg by mouth daily       GLIPIZIDE PO Take 7.5 mg by mouth every morning (before breakfast)       LEVOTHYROXINE SODIUM PO Take 25 mcg by mouth daily        METFORMIN HCL PO Take 1,000 mg by mouth 2 times daily        Psyllium (NATURAL FIBER) 48.57 % POWD Take by mouth daily       tamsulosin (FLOMAX) 0.4 MG capsule Take 0.4 mg by mouth daily        traMADol (ULTRAM) 50 MG tablet Take 50 mg by mouth daily as needed for severe pain           REVIEW OF SYSTEMS:  Unobtainable secondary to cognitive  impairment. Reports feeling ok today    Objective:  /67   Pulse 68   Resp 18   SpO2 93%   Exam:  GENERAL APPEARANCE:  Alert, in no distress, cooperative  ENT:  Mouth and posterior oropharynx normal, moist mucous membranes, [place], bilat ear canals free of cerumen  EYES:  EOM, conjunctivae, lids, pupils and irises normal, PERRL  NECK:  No adenopathy,masses or thyromegaly, FROM, no carotid bruit  RESP:  respiratory effort and palpation of chest normal, lungs clear to auscultation , no respiratory distress  CV:  Palpation and auscultation of heart done , regular rate and rhythm, no murmur, rub, or gallop, no edema  ABDOMEN:  normal bowel sounds, soft, nontender, no hepatosplenomegaly or other masses, no guarding or rebound  M/S:   Gait and station normal  sl difficulty standing from setaed position, able to stand indep.  muscle strength 5/5 all 4 ext, equal bilat.   NEURO:   Cranial nerves 2-12 are normal tested and grossly at patient's baseline, alert, speech clear  PSYCH:  insight and judgement impaired, memory impaired , affect abnormal -flat    Labs:     Most Recent 3 CBC's:  Recent Labs   Lab Test 09/12/17 01/08/17  2330   WBC 10.4 11.9*   HGB 13.2* 14.5   * 99    256     Most Recent 3 BMP's:  Recent Labs   Lab Test 01/08/19 02/27/18 09/28/17    136 138   POTASSIUM 4.6 4.5 4.2   CHLORIDE 103 104 103   CO2 23 23 25   BUN 21 21 31*   CR 1.49* 1.36* 1.84*   ANIONGAP 11 9 10   ELISHA 8.9 8.7 9.1   * 198* 139*     Most Recent Hemoglobin A1c:  Recent Labs   Lab Test 01/08/19   A1C 6.9*       ASSESSMENT/PLAN:  Type 2 diabetes mellitus with diabetic nephropathy, without long-term current use of insulin (H)  GLC levels 100s-200s past month, no noted episodes of hypoglycemia  1. Cont. Glipizide  2. Cont. metformin  3. Cont. Every day GLC levels  4. Monitor for decreased po intake, hypoglycemia  5. hgba1c in next 1-3 mos    Generalized muscle weakness  Ongoing. No recent falls. Gait currently  steady with walker  1. Remind to use walker at all times  2. Monitor for further increased diff. Standing from seated position  3. For above, may refer to PT    Late onset Alzheimer's disease without behavioral disturbance  Memory loss, some decreased verbal skills at times. Hearing loss. No focal neurological deficits  1. Cont. Secured memory care unit  2. Monitor for changes in neuros  3. Monitor for functional decline  4. Cont. Comfort cares      Electronically signed by:  RONY Sanchez CNP             Sincerely,        RONY Sanchez CNP

## 2019-07-24 NOTE — PROGRESS NOTES
[facility]  [place] Medical Record Number:  [id number]  Place of Service where encounter took place:  [facility] ([facility] [[id number]]  Chief Complaint   Patient presents with     Diabetes       HPI:    Farhad Roa  is a 89 year old (8/28/1923), who is being seen today for an episodic care visit.  HPI information obtained from: facility chart records, facility staff, patient report, [facility] chart review and family/first contact dtr report. Today's concern is: DM, weakness, alzheimer's.  For DM cont. On glipizide, metformin. Per dtr had episode of staring forward, no responding as quickly to questions.  No other focal neurological deficits reported.  dtr feels resident has had some increased gen weakness at times. Gait slowed, some increased difficulty standing from seated position.  No recent falls. No recent fever. Has memory loss due to alzheimer's.  Mood, behaviors gen. Stable.       Past Medical and Surgical History reviewed in Epic today.    MEDICATIONS:  Current Outpatient Medications   Medication Sig Dispense Refill     ACETAMINOPHEN PO Take 650 mg by mouth every 4 hours as needed for pain       Cranberry 450 MG CAPS Take by mouth 2 times daily       cyanocobalamin (VITAMIN  B-12) 1000 MCG tablet Take 1,000 mcg by mouth every other day        FINASTERIDE PO Take 5 mg by mouth daily       GLIPIZIDE PO Take 7.5 mg by mouth every morning (before breakfast)       LEVOTHYROXINE SODIUM PO Take 25 mcg by mouth daily        METFORMIN HCL PO Take 1,000 mg by mouth 2 times daily        Psyllium (NATURAL FIBER) 48.57 % POWD Take by mouth daily       tamsulosin (FLOMAX) 0.4 MG capsule Take 0.4 mg by mouth daily        traMADol (ULTRAM) 50 MG tablet Take 50 mg by mouth daily as needed for severe pain           REVIEW OF SYSTEMS:  Unobtainable secondary to cognitive impairment. Reports feeling ok today    Objective:  /67   Pulse 68   Resp 18   SpO2 93%   Exam:  GENERAL  APPEARANCE:  Alert, in no distress, cooperative  ENT:  Mouth and posterior oropharynx normal, moist mucous membranes, [place], bilat ear canals free of cerumen  EYES:  EOM, conjunctivae, lids, pupils and irises normal, PERRL  NECK:  No adenopathy,masses or thyromegaly, FROM, no carotid bruit  RESP:  respiratory effort and palpation of chest normal, lungs clear to auscultation , no respiratory distress  CV:  Palpation and auscultation of heart done , regular rate and rhythm, no murmur, rub, or gallop, no edema  ABDOMEN:  normal bowel sounds, soft, nontender, no hepatosplenomegaly or other masses, no guarding or rebound  M/S:   Gait and station normal  sl difficulty standing from setaed position, able to stand indep.  muscle strength 5/5 all 4 ext, equal bilat.   NEURO:   Cranial nerves 2-12 are normal tested and grossly at patient's baseline, alert, speech clear  PSYCH:  insight and judgement impaired, memory impaired , affect abnormal -flat    Labs:     Most Recent 3 CBC's:  Recent Labs   Lab Test 09/12/17 01/08/17  2330   WBC 10.4 11.9*   HGB 13.2* 14.5   * 99    256     Most Recent 3 BMP's:  Recent Labs   Lab Test 01/08/19 02/27/18 09/28/17    136 138   POTASSIUM 4.6 4.5 4.2   CHLORIDE 103 104 103   CO2 23 23 25   BUN 21 21 31*   CR 1.49* 1.36* 1.84*   ANIONGAP 11 9 10   ELISHA 8.9 8.7 9.1   * 198* 139*     Most Recent Hemoglobin A1c:  Recent Labs   Lab Test 01/08/19   A1C 6.9*       ASSESSMENT/PLAN:  Type 2 diabetes mellitus with diabetic nephropathy, without long-term current use of insulin (H)  GLC levels 100s-200s past month, no noted episodes of hypoglycemia  1. Cont. Glipizide  2. Cont. metformin  3. Cont. Every day GLC levels  4. Monitor for decreased po intake, hypoglycemia  5. hgba1c in next 1-3 mos    Generalized muscle weakness  Ongoing. No recent falls. Gait currently steady with walker  1. Remind to use walker at all times  2. Monitor for further increased diff. Standing from  seated position  3. For above, may refer to PT    Late onset Alzheimer's disease without behavioral disturbance  Memory loss, some decreased verbal skills at times. Hearing loss. No focal neurological deficits  1. Cont. Secured memory care unit  2. Monitor for changes in neuros  3. Monitor for functional decline  4. Cont. Comfort cares      Electronically signed by:  RONY Sanchez CNP

## 2019-09-27 NOTE — LETTER
9/27/2019        RE: Farhad Roa  [facility]  [address] [facility] [place] [address]        [facility]  PRIMARY CARE PROVIDER AND CLINIC:  RONY Sanchez CNP, [address] [place] [address] / [place] [address]  Chief Complaint   Patient presents with     Hospital F/U     [place] [facility] Record Number:  [id number]  [facility] [[id number][facility]    Farhad Roa  is a 89 year old  (8/28/1923), returned to the above facility from [facility]. TCU stay 9/12/19-9/26/19. Admitted to this facility for  rehab, medical management and nursing care.    HPI:    HPI information obtained from: facility chart records, facility staff, patient report, [facility] chart review and dtr.   Brief Summary of Hospital Course:   Fall     8/25/19 with fx of R humerus, comp. fx of C2, C6, T1, T11[facility]  [facility] CT neg for bleed, did show L occipital changes concerning for infarction. Started with [place] J collar. Cast to RUE. tx for UTI. CXR neg.    DMII. Metformin, glipizide dc'd during hosp. Stay. Started on sliding scale insulin.  GLC levels upper 100s-upper 200s.    Alzheimer's. Decreased verbalization.  Had increased agitation at times during TCU stay.  Cont. On seroquel, hs trazodone.  Mood, behaviors stable since return.       Updates on Status Since Skilled nursing Admission: po intake stable. No reports of increased pain    CODE STATUS/ADVANCE DIRECTIVES DISCUSSION:   DNR / DNI, Comfort Cares  Patient's living condition: lives in an assisted living facility  ALLERGIES: Ciprofloxacin; Lisinopril; and Penicillins  PAST MEDICAL HISTORY:  has a past medical history of Cancer (H), Depressive disorder, Diabetes (H), Hypertension, and Thyroid disease.  PAST SURGICAL HISTORY:   has no past surgical history on file.  FAMILY HISTORY: family history is not on file.  SOCIAL HISTORY:       Post Discharge  Medication Reconciliation Status: discharge medications reconciled and changed, per note/orders (see AVS)    Current Outpatient Medications   Medication Sig Dispense Refill     ACETAMINOPHEN PO Take 1,000 mg by mouth 4 times daily        bisacodyl (DULCOLAX) 10 MG suppository Place 10 mg rectally daily as needed for constipation       Cranberry 450 MG CAPS Take by mouth 2 times daily       cyanocobalamin (VITAMIN  B-12) 1000 MCG tablet Take 1,000 mcg by mouth every other day        FINASTERIDE PO Take 5 mg by mouth daily       insulin aspart (NOVOLOG FLEXPEN) 100 UNIT/ML pen Inject Subcutaneous 3 times daily (with meals) PER SLIDING SCALE:  200-249 = 2U  250-299 = 4U  300-349 = 6U  350-399 = 8U  400-450 = 10U  >450 = 12U       LEVOTHYROXINE SODIUM PO Take 25 mcg by mouth daily        melatonin 5 MG tablet Take 10 mg by mouth At Bedtime       metFORMIN (GLUCOPHAGE) 500 MG tablet Take 500 mg by mouth 2 times daily (with meals)       polyethylene glycol (MIRALAX/GLYCOLAX) packet Take 17 g by mouth daily       QUEtiapine (SEROQUEL) 25 MG tablet Take 12.5 mg by mouth At Bedtime AND 12.5 MG every day PRN       senna-docusate (SENOKOT-S/PERICOLACE) 8.6-50 MG tablet Take 1 tablet by mouth 2 times daily       sodium polystyrene (KAYEXALATE) 15 GM/60ML suspension Take 15 g by mouth daily Avoid taking other oral medications 3 hours before or after this medication.       sulfamethoxazole-trimethoprim (BACTRIM/SEPTRA) 400-80 MG tablet Take 1 tablet by mouth 2 times daily       tamsulosin (FLOMAX) 0.4 MG capsule Take 0.4 mg by mouth daily        traMADol (ULTRAM) 50 MG tablet Take 50 mg by mouth daily as needed for severe pain       traZODone (DESYREL) 50 MG tablet Take 25 mg by mouth At Bedtime         ROS:  Unobtainable secondary to cognitive impairment. Reports feeling ok today    Vitals:  /77   Pulse 82   Temp 97.2  F (36.2  C)   Resp 16   Wt 78.5 kg (173 lb)   Exam:  GENERAL APPEARANCE:  Alert, in no distress,  cooperative  ENT:  Mouth and posterior oropharynx normal, moist mucous membranes, [place]  EYES:  EOM, conjunctivae, lids, pupils and irises normal, PERRL  NECK:  No adenopathy,masses or thyromegaly, some decreased ROM rotation, neck  RESP:  respiratory effort and palpation of chest normal, lungs clear to auscultation , no respiratory distress  CV:  Palpation and auscultation of heart done , regular rate and rhythm, no murmur, rub, or gallop, peripheral edema trace+ in R hand  ABDOMEN:  normal bowel sounds, soft, nontender, no hepatosplenomegaly or other masses, no guarding or rebound  M/S:   muscle strength 5/5 LUE, LEs. R UE casted, in sling  NEURO:   Cranial nerves 2-12 are normal tested and grossly at patient's baseline, alert, speech clear  PSYCH:  insight and judgement impaired, memory impaired , affect abnormal -flat    Lab/Diagnostic data:    Most Recent 3 CBC's:  Recent Labs   Lab Test 09/12/17 01/08/17  2330   WBC 10.4 11.9*   HGB 13.2* 14.5   * 99    256     Most Recent 3 BMP's:  Recent Labs   Lab Test 01/08/19 02/27/18 09/28/17    136 138   POTASSIUM 4.6 4.5 4.2   CHLORIDE 103 104 103   CO2 23 23 25   BUN 21 21 31*   CR 1.49* 1.36* 1.84*   ANIONGAP 11 9 10   ELISHA 8.9 8.7 9.1   * 198* 139*       ASSESSMENT/PLAN:  Pain  Gen. Stable. S/p fall with R humerus fx, cervical, thoracic spine comp. fxs  1. Cont. Tylenol, prn ultram  2. Cont. PT/OT  3. [facility] to follow  4. Cont. RUE sling  5. Monitor CMS to R hand    Type 2 diabetes mellitus with diabetic nephropathy, without long-term current use of insulin (H)  GLC levels upper 100s-upper 200s. sliding scale insulin  1. Cont. Bid GLC levels  2. Cont. sliding scale insulin for now  3. Restart metformin -lower dose 500 mg bid  4. Reassess GLC levels over next few days, plan to discontinue SSI    Late onset Alzheimer's disease without behavioral disturbance (H)  Memory loss. Mood, behaviors gen. Stable  1. Cont. Seroquel, hs  trazodone  2. Monitor for agitation  3. Monitor for insomnia  4. Complete septra course for UTI       Electronically signed by:  RONY Sanchez CNP                       Sincerely,        RONY Sanchez CNP

## 2019-09-27 NOTE — PROGRESS NOTES
[facility]  PRIMARY CARE PROVIDER AND CLINIC:  Tay Mccormack, APRN CNP, [address] / [place] [address]  Chief Complaint   Patient presents with     Hospital F/U     [place] Medical Record Number:  [id number]  [facility] of Service where encounter took place[facility] ([facility] [[id number]]    Farhad Roa  is a 89 year old  (8/28/1923), returned to the above facility from [facility]. TCU stay 9/12/19-9/26/19. Admitted to this facility for  rehab, medical management and nursing care.    HPI:    HPI information obtained from: facility chart records, facility staff, patient report, [facility] chart review and dtr.   Brief Summary of Hospital Course:   Fall     8/25/19 with fx of R humerus, comp. fx of C2, C6, T1, T11. [facility]  [facility] CT neg for bleed, did show L occipital changes concerning for infarction. Started with [place] J collar. Cast to RUE. tx for UTI. CXR neg.    DMII. Metformin, glipizide dc'd during hosp. Stay. Started on sliding scale insulin.  GLC levels upper 100s-upper 200s.    Alzheimer's. Decreased verbalization.  Had increased agitation at times during TCU stay.  Cont. On seroquel, hs trazodone.  Mood, behaviors stable since return.       Updates on Status Since Skilled nursing Admission: po intake stable. No reports of increased pain    CODE STATUS/ADVANCE DIRECTIVES DISCUSSION:   DNR / DNI, Comfort Cares  Patient's living condition: lives in an assisted living facility  ALLERGIES: Ciprofloxacin; Lisinopril; and Penicillins  PAST MEDICAL HISTORY:  has a past medical history of Cancer (H), Depressive disorder, Diabetes (H), Hypertension, and Thyroid disease.  PAST SURGICAL HISTORY:   has no past surgical history on file.  FAMILY HISTORY: family history is not on file.  SOCIAL HISTORY:       Post Discharge Medication Reconciliation Status: discharge medications reconciled and changed, per note/orders (see AVS)    Current  Outpatient Medications   Medication Sig Dispense Refill     ACETAMINOPHEN PO Take 1,000 mg by mouth 4 times daily        bisacodyl (DULCOLAX) 10 MG suppository Place 10 mg rectally daily as needed for constipation       Cranberry 450 MG CAPS Take by mouth 2 times daily       cyanocobalamin (VITAMIN  B-12) 1000 MCG tablet Take 1,000 mcg by mouth every other day        FINASTERIDE PO Take 5 mg by mouth daily       insulin aspart (NOVOLOG FLEXPEN) 100 UNIT/ML pen Inject Subcutaneous 3 times daily (with meals) PER SLIDING SCALE:  200-249 = 2U  250-299 = 4U  300-349 = 6U  350-399 = 8U  400-450 = 10U  >450 = 12U       LEVOTHYROXINE SODIUM PO Take 25 mcg by mouth daily        melatonin 5 MG tablet Take 10 mg by mouth At Bedtime       metFORMIN (GLUCOPHAGE) 500 MG tablet Take 500 mg by mouth 2 times daily (with meals)       polyethylene glycol (MIRALAX/GLYCOLAX) packet Take 17 g by mouth daily       QUEtiapine (SEROQUEL) 25 MG tablet Take 12.5 mg by mouth At Bedtime AND 12.5 MG every day PRN       senna-docusate (SENOKOT-S/PERICOLACE) 8.6-50 MG tablet Take 1 tablet by mouth 2 times daily       sodium polystyrene (KAYEXALATE) 15 GM/60ML suspension Take 15 g by mouth daily Avoid taking other oral medications 3 hours before or after this medication.       sulfamethoxazole-trimethoprim (BACTRIM/SEPTRA) 400-80 MG tablet Take 1 tablet by mouth 2 times daily       tamsulosin (FLOMAX) 0.4 MG capsule Take 0.4 mg by mouth daily        traMADol (ULTRAM) 50 MG tablet Take 50 mg by mouth daily as needed for severe pain       traZODone (DESYREL) 50 MG tablet Take 25 mg by mouth At Bedtime         ROS:  Unobtainable secondary to cognitive impairment. Reports feeling ok today    Vitals:  /77   Pulse 82   Temp 97.2  F (36.2  C)   Resp 16   Wt 78.5 kg (173 lb)   Exam:  GENERAL APPEARANCE:  Alert, in no distress, cooperative  ENT:  Mouth and posterior oropharynx normal, moist mucous membranes, [place]  EYES:  EOM, conjunctivae, lids,  pupils and irises normal, PERRL  NECK:  No adenopathy,masses or thyromegaly, some decreased ROM rotation, neck  RESP:  respiratory effort and palpation of chest normal, lungs clear to auscultation , no respiratory distress  CV:  Palpation and auscultation of heart done , regular rate and rhythm, no murmur, rub, or gallop, peripheral edema trace+ in R hand  ABDOMEN:  normal bowel sounds, soft, nontender, no hepatosplenomegaly or other masses, no guarding or rebound  M/S:   muscle strength 5/5 LUE, LEs. R UE casted, in sling  NEURO:   Cranial nerves 2-12 are normal tested and grossly at patient's baseline, alert, speech clear  PSYCH:  insight and judgement impaired, memory impaired , affect abnormal -flat    Lab/Diagnostic data:    Most Recent 3 CBC's:  Recent Labs   Lab Test 09/12/17 01/08/17  2330   WBC 10.4 11.9*   HGB 13.2* 14.5   * 99    256     Most Recent 3 BMP's:  Recent Labs   Lab Test 01/08/19 02/27/18 09/28/17    136 138   POTASSIUM 4.6 4.5 4.2   CHLORIDE 103 104 103   CO2 23 23 25   BUN 21 21 31*   CR 1.49* 1.36* 1.84*   ANIONGAP 11 9 10   ELISHA 8.9 8.7 9.1   * 198* 139*       ASSESSMENT/PLAN:  Pain  Gen. Stable. S/p fall with R humerus fx, cervical, thoracic spine comp. fxs  1. Cont. Tylenol, prn ultram  2. Cont. PT/OT  3. [facility] [place] [facility] to follow  4. Cont. RUE sling  5. Monitor CMS to R hand    Type 2 diabetes mellitus with diabetic nephropathy, without long-term current use of insulin (H)  GLC levels upper 100s-upper 200s. sliding scale insulin  1. Cont. Bid GLC levels  2. Cont. sliding scale insulin for now  3. Restart metformin -lower dose 500 mg bid  4. Reassess GLC levels over next few days, plan to discontinue SSI    Late onset Alzheimer's disease without behavioral disturbance (H)  Memory loss. Mood, behaviors gen. Stable  1. Cont. Seroquel, hs trazodone  2. Monitor for agitation  3. Monitor for insomnia  4. Complete septra course for UTI       Electronically signed  by:  RONY Sanchez CNP

## 2019-09-30 NOTE — PROGRESS NOTES
Ortho Nursing home visit    Farhad Roa is a 89 year old male who resides at [facility].    Patient is seen today for on-site visit, 10 days out from Right Mid shaft displaced humerus fracture; treated closed in [place] brace; Shoulder immobilizer;  Patient has dementia, and family wishes to have patient seen on-site a care facility;      Past Medical History:   Diagnosis Date     Cancer (H)      Depressive disorder      Diabetes (H)      Hypertension      Thyroid disease       No past surgical history on file.     Allergies   Allergen Reactions     Ciprofloxacin      Lisinopril Unknown     Penicillins Unknown      There were no vitals taken for this visit.     Exam: With staff and family, brace is removed, moderate amount, of edema throughout right UE; bruising into axilla 3 plus edema into hand;      X-rays pending;    ASSESSMENT / PLAN:  Irving shirt applied, Eric brace and shoulder immobilizer re-applied, will obtain x-rays to check position, patient is not a surgical candidate; will continue to follow on-site;    Family and staff have my contact # should issues arise with splinting.    Patient will remain NWB to right UE x 12 weeks,           Gabe OPA-C  [phone] cell

## 2019-10-09 NOTE — LETTER
10/9/2019        RE: Farhad Roa  [facility]  [address] [facility] [address]        [facility]  [place] Medical Record Number:  [id number]  Place of Service where encounter took place:  [facility] ([facility] [[id number]]  Chief Complaint   Patient presents with     Pain       HPI:    Farhad Roa  is a 89 year old (8/28/1923), who is being seen today for an episodic care visit.  HPI information obtained from: facility chart records, facility staff, patient report and [facility] chart review. Today's concern is: pain, DM, diarrhea.  S/p fall with fx R humerus, displaced. Cont. With RUE Eric brace, RUE sling, immobilizer. RUE edema sig. Improved. Now plastic brace too lose to immobilize shoulder. Has occ increased pain.  Has tylenol, prn ultram. No recent use of prn ultram.  Cont. With PT. Requires w.c. assessment for wider w.c. or lower R arm rest -diff hanging RUE in sling-tends to rest on arm rest.  For DM cont. On sliding scale insulin. Metformin restarted at lower dose since return to [place]. GLC levels stable. Since starting metformin, has had increased diarrhea. Started on prn imodium.      Past Medical and Surgical History reviewed in Epic today.    MEDICATIONS:  Current Outpatient Medications   Medication Sig Dispense Refill     ACETAMINOPHEN PO Take 1,000 mg by mouth 4 times daily        bisacodyl (DULCOLAX) 10 MG suppository Place 10 mg rectally daily as needed for constipation       Cranberry 450 MG CAPS Take by mouth 2 times daily       cyanocobalamin (VITAMIN  B-12) 1000 MCG tablet Take 1,000 mcg by mouth every other day        FINASTERIDE PO Take 5 mg by mouth daily       insulin aspart (NOVOLOG FLEXPEN) 100 UNIT/ML pen Inject Subcutaneous 3 times daily (with meals) PER SLIDING SCALE:  200-249 = 2U  250-299 = 4U  300-349 = 6U  350-399 = 8U  400-450 = 10U  >450 = 12U       LEVOTHYROXINE SODIUM PO Take 25 mcg by mouth daily        loperamide  "(IMODIUM) 2 MG capsule Take 2 mg by mouth every morning And tid prn       melatonin 5 MG tablet Take 10 mg by mouth At Bedtime       metFORMIN (GLUCOPHAGE) 500 MG tablet Take 500 mg by mouth 2 times daily (with meals)       polyethylene glycol (MIRALAX/GLYCOLAX) packet Take 17 g by mouth daily       Psyllium 48.57 % POWD Take 1 teaspoonful by mouth daily       QUEtiapine (SEROQUEL) 25 MG tablet Take 12.5 mg by mouth 2 times daily AND 12.5 MG every day PRN        senna-docusate (SENOKOT-S/PERICOLACE) 8.6-50 MG tablet Take 1 tablet by mouth 2 times daily       sodium polystyrene (KAYEXALATE) 15 GM/60ML suspension Take 15 g by mouth daily Avoid taking other oral medications 3 hours before or after this medication.       tamsulosin (FLOMAX) 0.4 MG capsule Take 0.4 mg by mouth daily        traMADol (ULTRAM) 50 MG tablet Take 0.5 tablets (25 mg) by mouth 2 times daily as needed for severe pain 45 tablet 5     traZODone (DESYREL) 50 MG tablet Take 25 mg by mouth At Bedtime           REVIEW OF SYSTEMS:  Unobtainable secondary to cognitive impairment. Reports feeling ok today, ongoing diarrhea    Objective:  /67   Pulse 79   Resp 18   Ht 1.778 m (5' 10\")   Wt 78.5 kg (173 lb)   SpO2 92%   BMI 24.82 kg/m     Exam:  GENERAL APPEARANCE:  Alert, in no distress, cooperative  ENT:  Mouth and posterior oropharynx normal, moist mucous membranes, [place]  EYES:  EOM, conjunctivae, lids, pupils and irises normal, PERRL  NECK:  No adenopathy,masses or thyromegaly, FROM  RESP:  respiratory effort and palpation of chest normal, lungs clear to auscultation , no respiratory distress  CV:  Palpation and auscultation of heart done , regular rate and rhythm, no murmur, rub, or gallop, peripheral edema trace-1+ in RUE  ABDOMEN:  normal bowel sounds, soft, nontender, no hepatosplenomegaly or other masses, no guarding or rebound  M/S:   muscle strength 5/5 LUE, LEs with gen LE weakness. decreased ROM RUE-immobilizer  NEURO:   Cranial " nerves 2-12 are normal tested and grossly at patient's baseline, alert, speech clear  PSYCH:  insight and judgement impaired, memory impaired , affect and mood normal    Labs:     Most Recent 3 CBC's:  Recent Labs   Lab Test 09/12/17 01/08/17  2330   WBC 10.4 11.9*   HGB 13.2* 14.5   * 99    256     Most Recent 3 BMP's:  Recent Labs   Lab Test 01/08/19 02/27/18 09/28/17    136 138   POTASSIUM 4.6 4.5 4.2   CHLORIDE 103 104 103   CO2 23 23 25   BUN 21 21 31*   CR 1.49* 1.36* 1.84*   ANIONGAP 11 9 10   ELISHA 8.9 8.7 9.1   * 198* 139*       ASSESSMENT/PLAN:  Pain  S/p R humeral fx  - traMADol (ULTRAM) 50 MG tablet; Take 0.5 tablets (25 mg) by mouth 2 times daily as needed for severe pain  2. Cont. Tylenol  3. PT for LE weakness. Refer to OT for w.c., requires lower R arm rest  4. Amb. As jose  5. discontinue RUE plastic brace, cont. Immobilizer, XRs next month per [facility]    Type 2 diabetes mellitus with diabetic nephropathy, without long-term current use of insulin (H)  GLC levels stable no recent sliding scale insulin given per scale  1. Cont. Metformin  2. Cont. Tid GLC levels  3. Reassess GLC levels over next week, if stable, discontinue sliding scale insulin, change GLC check freq. To qd    Diarrhea, unspecified type  Ongoing s/s, likely r/t metformin  1. Cont. Prn imodium  2. Start qam imodium  3. Reassess over next week, for further ongoing s/s, consider discontinue of metformin, restart glipizide if po intake stable      Electronically signed by:  RONY Sanchez CNP         Documentation of Face-to-Face and Certification for Home Health Services     Patient: Farhad Roa   YOB: 1923  MR Number: [id number]  Today's Date: 10/9/2019    I certify that patient: Farhad Roa is under my care and that I, or a nurse practitioner or physician's assistant working with me, had a face-to-face encounter that meets the physician face-to-face encounter  requirements with this patient on: 10/9/2019.    This encounter with the patient was in whole, or in part, for the following medical condition, which is the primary reason for home health care: LE weakness.    I certify that, based on my findings, the following services are medically necessary home health services: Occupational Therapy and Physical Therapy.    My clinical findings support the need for the above services because: Occupational Therapy Services are needed to assess and treat cognitive ability and address ADL safety due to impairment in wheelchair assessment. and Physical Therapy Services are needed to assess and treat the following functional impairments: LE weakness.    Further, I certify that my clinical findings support that this patient is homebound (i.e. absences from home require considerable and taxing effort and are for medical reasons or [employer] services or infrequently or of short duration when for other reasons) because: Requires assistance of another person or specialized equipment to access medical services because patient: Range of motion limitations prevents ability to exit home safely. and  memory loss..    Based on the above findings. I certify that this patient is confined to the home and needs intermittent skilled nursing care, physical therapy and/or speech therapy.  The patient is under my care, and I have initiated the establishment of the plan of care.  This patient will be followed by a physician who will periodically review the plan of care.  Physician/Provider to provide follow up care: Tay Mccormack    Responsible Medicare certified [place] Physician: Heidy Melton  Physician Signature: See electronic signature associated with these discharge orders.  Date: 10/9/2019          Face to Face and Medical Necessity Statement for DME Provider visit    Demographic Information on Farhad Roa:  Gender: male  : 1923  [facility]  [address]  [place]  [facility] [address]  [phone] (home)     Medical Record: [id number]  Social Security Number: [id number]  Primary Care Provider: Tay Mccormack  Insurance: Payor: MEDICARE / Plan: MEDICARE / Product Type: Medicare /     HPI:   Farhad Roa is a 89 year old  (8/28/1923), who is being seen today for a face to face provider visit at [facility]; medical necessity statement for DME included. This patient requires the following:  DME Ordered and Medical Necessity Statement     Wheelchair Documentation  Size: 18 x 16  Corresponding cushion: Yes: pressure relieving  Standard foot rests: Yes  Elevating leg rests: No  Arm rests: Yes: low R arm rest  Lap tray: No  Dose the patient use oxygen? No   Is the patient able to propel wheelchair? Yes If no why not? Immobilizer to RUE And is there someone who can?yes, staff  1. The patient has mobility limitations that impairs their ability to participate in one or more mobility related activities: Toileting, Feeding, Grooming and Bathing.  The wheelchair is suitable and necessary for use in the patient's home.  2. The patient's mobility limitations cannot be safely resolved by using a cane/walker:Yes    Reason why a cane or walker will not meet the patient's needs. (ie: balance, tolerance, level of assistance) unable to w.b. RUE due to fx, immobilizer  3. The patients home has adequate access to use a manual wheelchair:Yes  4. The use of a manual wheelchair on a regular basis will improve the patients ability to participate in mobility related ADL's at home:Yes  5. The patient is willing to use a manual wheelchair at home:Yes  6. The patient has adequate upper body strength and the mental capability to safely use a manual wheelchair and/or has a caregiver that is able to assist: Yes  7. Does the patient have a lower extremity injury or edema?No  Reason for Type of Wheelchair  Patient weight: 0 lbs 0 oz  Light Weight Wheelchair: Patient is unable to  "self-propel a standard wheelchair in the home but can self propel a light weight wheelchair.        Pt needing above DME with expected length of need of 99 mos due to medical necessity associated with following diagnosis:     Pain  Type 2 diabetes mellitus with diabetic nephropathy, without long-term current use of insulin (H)  Diarrhea, unspecified type      PMH   has a past medical history of Cancer (H), Depressive disorder, Diabetes (H), Hypertension, and Thyroid disease.        EXAM  Vitals: /67   Pulse 79   Resp 18   Ht 1.778 m (5' 10\")   Wt 78.5 kg (173 lb)   SpO2 92%   BMI 24.82 kg/m   ;BMI= Body mass index is 24.82 kg/m .ASSESSMENT/PLAN:  1. Pain    2. Type 2 diabetes mellitus with diabetic nephropathy, without long-term current use of insulin (H)    3. Diarrhea, unspecified type        Orders:  1. Facility staff/TC to contact DME company to get their order form for provider to fill out    ELECTRONICALLY SIGNED BY NARINDER CERTIFIED PROVIDER:  RONY Sanchez CNP   NPI: [id number]  [facility]  [address] [place] [address], [address]  [place] [address]        Sincerely,        RONY Sanchez CNP    "

## 2019-11-01 NOTE — LETTER
11/1/2019        RE: Farhad Roa  [facility]  [address] [facility] [address]        [place]  [place] Medical Record Number:  [id number]  Place of Service where encounter took place:  [facility] [[id number]]  Chief Complaint   Patient presents with     Confusion     Diabetes       HPI:    Farhad Roa  is a 89 year old (8/28/1923), who is being seen today for an episodic care visit.  HPI information obtained from: facility chart records, facility staff and [facility] chart review. Today's concern is: confusion, DM, diarrhea. Has had increased confusion past few days, more pronounced today.  Having hallucinations a times. Cont on seroquel. Has h/o UTIs-tx with septra for UTI during last hosp stay.  Afebrile. Incont. Of urine. GLC levels stable. Cont. On metformin. GLC levels 90s-100s. Per staff, po intake has been stable, hos had ongoing gradual wt. Loss. No increased diarrhea.  Cont. On imodium.       Past Medical and Surgical History reviewed in Epic today.    MEDICATIONS:  Current Outpatient Medications   Medication Sig Dispense Refill     ACETAMINOPHEN PO Take 1,000 mg by mouth 4 times daily        bisacodyl (DULCOLAX) 10 MG suppository Place 10 mg rectally daily as needed for constipation       Cranberry 450 MG CAPS Take by mouth 2 times daily       cyanocobalamin (VITAMIN  B-12) 1000 MCG tablet Take 1,000 mcg by mouth every other day        FINASTERIDE PO Take 5 mg by mouth daily       insulin aspart (NOVOLOG FLEXPEN) 100 UNIT/ML pen Inject Subcutaneous 3 times daily (with meals) PER SLIDING SCALE:  200-249 = 2U  250-299 = 4U  300-349 = 6U  350-399 = 8U  400-450 = 10U  >450 = 12U       LEVOTHYROXINE SODIUM PO Take 25 mcg by mouth daily        loperamide (IMODIUM) 2 MG capsule Take 2 mg by mouth every morning And tid prn       melatonin 5 MG tablet Take 10 mg by mouth At Bedtime       metFORMIN (GLUCOPHAGE) 500 MG tablet Take 500 mg by  mouth 2 times daily (with meals)       polyethylene glycol (MIRALAX/GLYCOLAX) packet Take 17 g by mouth daily       Psyllium 48.57 % POWD Take 1 teaspoonful by mouth daily       QUEtiapine (SEROQUEL) 25 MG tablet Take 12.5 mg by mouth 2 times daily AND 12.5 MG every day PRN        senna-docusate (SENOKOT-S/PERICOLACE) 8.6-50 MG tablet Take 1 tablet by mouth 2 times daily       sodium polystyrene (KAYEXALATE) 15 GM/60ML suspension Take 15 g by mouth daily Avoid taking other oral medications 3 hours before or after this medication.       tamsulosin (FLOMAX) 0.4 MG capsule Take 0.4 mg by mouth daily        traMADol (ULTRAM) 50 MG tablet Take 0.5 tablets (25 mg) by mouth 2 times daily as needed for severe pain 45 tablet 5     traZODone (DESYREL) 50 MG tablet Take 25 mg by mouth At Bedtime           REVIEW OF SYSTEMS:  Unobtainable secondary to cognitive impairment.     Objective:  /77   Temp 97.3  F (36.3  C)   Resp 18   Wt 77.1 kg (170 lb)   SpO2 91%   BMI 24.39 kg/m     Exam:  GENERAL APPEARANCE:  Alert, anxious  ENT:  Mouth and posterior oropharynx normal, moist mucous membranes, [place]  EYES:  EOM, conjunctivae, lids, pupils and irises normal, PERRL  NECK:  No adenopathy,masses or thyromegaly, no carotid bruit  RESP:  respiratory effort and palpation of chest normal, lungs clear to auscultation , no respiratory distress  CV:  Palpation and auscultation of heart done , regular rate and rhythm, no murmur, rub, or gallop, no edema  ABDOMEN:  normal bowel sounds, soft, nontender, no hepatosplenomegaly or other masses, no guarding or rebound  M/S:   muscle strength 5/5 LUE, RUE in imobilizer. gen. LE weakness  NEURO:   Cranial nerves 2-12 are normal tested and grossly at patient's baseline, alert, speech clear  PSYCH:  insight and judgement impaired, memory impaired , affect abnormal flat, anxious    Labs:     Most Recent 3 CBC's:  Recent Labs   Lab Test 09/12/17 01/08/17  2330   WBC 10.4 11.9*   HGB 13.2* 14.5    * 99    256     Most Recent 3 BMP's:  Recent Labs   Lab Test 01/08/19 02/27/18 09/28/17    136 138   POTASSIUM 4.6 4.5 4.2   CHLORIDE 103 104 103   CO2 23 23 25   BUN 21 21 31*   CR 1.49* 1.36* 1.84*   ANIONGAP 11 9 10   ELISHA 8.9 8.7 9.1   * 198* 139*       ASSESSMENT/PLAN:  Confusion  Increased. H/o UTIs. Urinary incontinence.   1. Start septra for UTI  2. Monitor for fever  3. Encourage fluids  4. Cont. Sched. Prn seroquel  5. Reassess over next few days, if agitation not improved, may increase sched. seroquel dose    Type 2 diabetes mellitus with diabetic nephropathy, without long-term current use of insulin (H)  GLC levels currently stable  1. Cont. Metformin  2. Cont. Tid GLC levels  3. Cont. sliding scale insulin, novolog for now, if not used over next couple weeks, discontinue  4. Monitor for decreased po intake    Diarrhea, unspecified type  Currently stable  1. Cont. Sched, prn imodium  2. Monitor for increased s/s with starting septra  3. For ongoing s/s once septra complete, may decrease metformin dose      Electronically signed by:  RONY Sanchez CNP             Sincerely,        RONY Sanchez CNP

## 2019-11-01 NOTE — PROGRESS NOTES
[facility]  [place] Medical Record Number:  [id number]  Place of Service where encounter took place:  [facility] ([facility] [[id number]]  Chief Complaint   Patient presents with     Confusion     Diabetes       HPI:    Farhad Roa  is a 89 year old (8/28/1923), who is being seen today for an episodic care visit.  HPI information obtained from: facility chart records, facility staff and [facility] chart review. Today's concern is: confusion, DM, diarrhea. Has had increased confusion past few days, more pronounced today.  Having hallucinations a times. Cont on seroquel. Has h/o UTIs-tx with septra for UTI during last hosp stay.  Afebrile. Incont. Of urine. GLC levels stable. Cont. On metformin. GLC levels 90s-100s. Per staff, po intake has been stable, hos had ongoing gradual wt. Loss. No increased diarrhea.  Cont. On imodium.       Past Medical and Surgical History reviewed in Epic today.    MEDICATIONS:  Current Outpatient Medications   Medication Sig Dispense Refill     ACETAMINOPHEN PO Take 1,000 mg by mouth 4 times daily        bisacodyl (DULCOLAX) 10 MG suppository Place 10 mg rectally daily as needed for constipation       Cranberry 450 MG CAPS Take by mouth 2 times daily       cyanocobalamin (VITAMIN  B-12) 1000 MCG tablet Take 1,000 mcg by mouth every other day        FINASTERIDE PO Take 5 mg by mouth daily       insulin aspart (NOVOLOG FLEXPEN) 100 UNIT/ML pen Inject Subcutaneous 3 times daily (with meals) PER SLIDING SCALE:  200-249 = 2U  250-299 = 4U  300-349 = 6U  350-399 = 8U  400-450 = 10U  >450 = 12U       LEVOTHYROXINE SODIUM PO Take 25 mcg by mouth daily        loperamide (IMODIUM) 2 MG capsule Take 2 mg by mouth every morning And tid prn       melatonin 5 MG tablet Take 10 mg by mouth At Bedtime       metFORMIN (GLUCOPHAGE) 500 MG tablet Take 500 mg by mouth 2 times daily (with meals)       polyethylene glycol (MIRALAX/GLYCOLAX) packet Take 17 g by mouth daily        Psyllium 48.57 % POWD Take 1 teaspoonful by mouth daily       QUEtiapine (SEROQUEL) 25 MG tablet Take 12.5 mg by mouth 2 times daily AND 12.5 MG every day PRN        senna-docusate (SENOKOT-S/PERICOLACE) 8.6-50 MG tablet Take 1 tablet by mouth 2 times daily       sodium polystyrene (KAYEXALATE) 15 GM/60ML suspension Take 15 g by mouth daily Avoid taking other oral medications 3 hours before or after this medication.       tamsulosin (FLOMAX) 0.4 MG capsule Take 0.4 mg by mouth daily        traMADol (ULTRAM) 50 MG tablet Take 0.5 tablets (25 mg) by mouth 2 times daily as needed for severe pain 45 tablet 5     traZODone (DESYREL) 50 MG tablet Take 25 mg by mouth At Bedtime           REVIEW OF SYSTEMS:  Unobtainable secondary to cognitive impairment.     Objective:  /77   Temp 97.3  F (36.3  C)   Resp 18   Wt 77.1 kg (170 lb)   SpO2 91%   BMI 24.39 kg/m    Exam:  GENERAL APPEARANCE:  Alert, anxious  ENT:  Mouth and posterior oropharynx normal, moist mucous membranes, [place]  EYES:  EOM, conjunctivae, lids, pupils and irises normal, PERRL  NECK:  No adenopathy,masses or thyromegaly, no carotid bruit  RESP:  respiratory effort and palpation of chest normal, lungs clear to auscultation , no respiratory distress  CV:  Palpation and auscultation of heart done , regular rate and rhythm, no murmur, rub, or gallop, no edema  ABDOMEN:  normal bowel sounds, soft, nontender, no hepatosplenomegaly or other masses, no guarding or rebound  M/S:   muscle strength 5/5 LUE, RUE in imobilizer. gen. LE weakness  NEURO:   Cranial nerves 2-12 are normal tested and grossly at patient's baseline, alert, speech clear  PSYCH:  insight and judgement impaired, memory impaired , affect abnormal flat, anxious    Labs:     Most Recent 3 CBC's:  Recent Labs   Lab Test 09/12/17 01/08/17  2330   WBC 10.4 11.9*   HGB 13.2* 14.5   * 99    256     Most Recent 3 BMP's:  Recent Labs   Lab Test 01/08/19 02/27/18 09/28/17    213  138   POTASSIUM 4.6 4.5 4.2   CHLORIDE 103 104 103   CO2 23 23 25   BUN 21 21 31*   CR 1.49* 1.36* 1.84*   ANIONGAP 11 9 10   ELISHA 8.9 8.7 9.1   * 198* 139*       ASSESSMENT/PLAN:  Confusion  Increased. H/o UTIs. Urinary incontinence.   1. Start septra for UTI  2. Monitor for fever  3. Encourage fluids  4. Cont. Sched. Prn seroquel  5. Reassess over next few days, if agitation not improved, may increase sched. seroquel dose    Type 2 diabetes mellitus with diabetic nephropathy, without long-term current use of insulin (H)  GLC levels currently stable  1. Cont. Metformin  2. Cont. Tid GLC levels  3. Cont. sliding scale insulin, novolog for now, if not used over next couple weeks, discontinue  4. Monitor for decreased po intake    Diarrhea, unspecified type  Currently stable  1. Cont. Sched, prn imodium  2. Monitor for increased s/s with starting septra  3. For ongoing s/s once septra complete, may decrease metformin dose      Electronically signed by:  RONY Sanchez CNP

## 2019-11-06 NOTE — PROGRESS NOTES
[place] [facility]  [place] [facility] Record Number:  [id number]  Place of Service where encounter took place:  [facility] ([facility]) [[id number]]  Chief Complaint   Patient presents with     Pain     Dementia       HPI:    Farhad Roa  is a 89 year old (8/28/1923), who is being seen today for an episodic care visit.  HPI information obtained from: facility chart records, facility staff, [facility] chart review and family/first contact dtr report. Today's concern is: pain, DM, alzheimer's.  S/p fall wth R humeral fx, cervical spine fxs.  Cont. With RUE immobilizer. Taking prn ultram for pain. Appears to have pain at times, however over past couple weeks has had increased agitation, restless due to alzheimer's.  Having hallucinations. Completed septra course for possible UTI-s/s have not improved. More sleepy during day. Decreased po intake.  Cont on metformin for DM. GLC levels in 100s. Meeting with family today to discuss hospice.       Past Medical and Surgical History reviewed in Epic today.    MEDICATIONS:  Current Outpatient Medications   Medication Sig Dispense Refill     ACETAMINOPHEN PO Take 1,000 mg by mouth 4 times daily        bisacodyl (DULCOLAX) 10 MG suppository Place 10 mg rectally daily as needed for constipation       FINASTERIDE PO Take 5 mg by mouth daily       LEVOTHYROXINE SODIUM PO Take 25 mcg by mouth daily        melatonin 5 MG tablet Take 10 mg by mouth At Bedtime       polyethylene glycol (MIRALAX/GLYCOLAX) packet Take 17 g by mouth daily       Psyllium 48.57 % POWD Take 1 teaspoonful by mouth daily       QUEtiapine (SEROQUEL) 25 MG tablet Take 25 mg by mouth 3 times daily AND 25 MG every day PRN       tamsulosin (FLOMAX) 0.4 MG capsule Take 0.4 mg by mouth daily        traMADol (ULTRAM) 50 MG tablet Take 0.5 tablets (25 mg) by mouth 4 times daily as needed for severe pain 100 tablet 5     traZODone (DESYREL) 50 MG tablet Take 25 mg by mouth At Bedtime            REVIEW OF SYSTEMS:  Unobtainable secondary to cognitive impairment.     Objective:  /66   Pulse 75   Resp 16   SpO2 93%   Exam:  GENERAL APPEARANCE:  sleepy, restless a times  ENT:  Mouth and posterior oropharynx normal, moist mucous membranes, [place]  EYES:  EOM, conjunctivae, lids, pupils and irises normal, PERRL  NECK:  No adenopathy,masses or thyromegaly, no carotid bruit  RESP:  respiratory effort and palpation of chest normal, lungs clear to auscultation , no respiratory distress  CV:  Palpation and auscultation of heart done , regular rate and rhythm, no murmur, rub, or gallop, peripheral edema 1+ in RUE  ABDOMEN:  normal bowel sounds, soft, nontender, no hepatosplenomegaly or other masses, no guarding or rebound  M/S:   gen. weakness, RUE in immobilizer  NEURO:   Cranial nerves 2-12 are normal tested and grossly at patient's baseline, speech clear  PSYCH:  affect abnormal -flat, anxious at times. occ calls out    Labs:     Most Recent 3 CBC's:  Recent Labs   Lab Test 09/12/17 01/08/17  2330   WBC 10.4 11.9*   HGB 13.2* 14.5   * 99    256     Most Recent 3 BMP's:  Recent Labs   Lab Test 01/08/19 02/27/18 09/28/17    136 138   POTASSIUM 4.6 4.5 4.2   CHLORIDE 103 104 103   CO2 23 23 25   BUN 21 21 31*   CR 1.49* 1.36* 1.84*   ANIONGAP 11 9 10   ELISHA 8.9 8.7 9.1   * 198* 139*       ASSESSMENT/PLAN:  Pain  occ s/s, difficult to assess given memory loss  1. Cont. Prn ultram for now  2. Cont. Tylenol  3. Cont RUE immobilizer, replace as needed-takes off at times  4. Reassess over next couple days, consider change to MS devorah if hospice started    Type 2 diabetes mellitus with diabetic nephropathy, without long-term current use of insulin (H)  GLC levels 100s. Recent decreased po intake. Ongoing loose stools at times  1. Discontinue metfromin  2. discontinue GLC levels  3. Discontinue sched. Imodium, cont. Prn imodium  4. Discontinue sliding scale insulin-has not recently  needed    Late onset Alzheimer's disease without behavioral disturbance (H)  Hallucinations, agitatio, restlessness at times. serqouel dose increase earlier this week-has been somewhat effective  1. Cont. Sched, prn seroquel  2. Monitor for ongoing hallucinations  3. Refer to hospice  4. Cont comfort cares per dtr's wishes      Electronically signed by:  RONY Sanchez CNP

## 2019-11-06 NOTE — LETTER
11/6/2019        RE: Farhad Roa  [facility]  [address] [address] [place] [address]        [place] [facility]  [place] [facility] Record Number:  [id number]  Place of Service where encounter took place[facility] ([facility] [[id number]]  Chief Complaint   Patient presents with     Pain     Dementia       HPI:    Farhad Roa  is a 89 year old (8/28/1923), who is being seen today for an episodic care visit.  HPI information obtained from: facility chart records, facility staff, [facility] chart review and family/first contact dtr report. Today's concern is: pain, DM, alzheimer's.  S/p fall wth R humeral fx, cervical spine fxs.  Cont. With RUE immobilizer. Taking prn ultram for pain. Appears to have pain at times, however over past couple weeks has had increased agitation, restless due to alzheimer's.  Having hallucinations. Completed septra course for possible UTI-s/s have not improved. More sleepy during day. Decreased po intake.  Cont on metformin for DM. GLC levels in 100s. Meeting with family today to discuss hospice.       Past Medical and Surgical History reviewed in Epic today.    MEDICATIONS:  Current Outpatient Medications   Medication Sig Dispense Refill     ACETAMINOPHEN PO Take 1,000 mg by mouth 4 times daily        bisacodyl (DULCOLAX) 10 MG suppository Place 10 mg rectally daily as needed for constipation       FINASTERIDE PO Take 5 mg by mouth daily       LEVOTHYROXINE SODIUM PO Take 25 mcg by mouth daily        melatonin 5 MG tablet Take 10 mg by mouth At Bedtime       polyethylene glycol (MIRALAX/GLYCOLAX) packet Take 17 g by mouth daily       Psyllium 48.57 % POWD Take 1 teaspoonful by mouth daily       QUEtiapine (SEROQUEL) 25 MG tablet Take 25 mg by mouth 3 times daily AND 25 MG every day PRN       tamsulosin (FLOMAX) 0.4 MG capsule Take 0.4 mg by mouth daily        traMADol (ULTRAM) 50 MG tablet Take 0.5 tablets (25 mg) by mouth 4 times daily as  needed for severe pain 100 tablet 5     traZODone (DESYREL) 50 MG tablet Take 25 mg by mouth At Bedtime           REVIEW OF SYSTEMS:  Unobtainable secondary to cognitive impairment.     Objective:  /66   Pulse 75   Resp 16   SpO2 93%   Exam:  GENERAL APPEARANCE:  sleepy, restless a times  ENT:  Mouth and posterior oropharynx normal, moist mucous membranes, [place]  EYES:  EOM, conjunctivae, lids, pupils and irises normal, PERRL  NECK:  No adenopathy,masses or thyromegaly, no carotid bruit  RESP:  respiratory effort and palpation of chest normal, lungs clear to auscultation , no respiratory distress  CV:  Palpation and auscultation of heart done , regular rate and rhythm, no murmur, rub, or gallop, peripheral edema 1+ in RUE  ABDOMEN:  normal bowel sounds, soft, nontender, no hepatosplenomegaly or other masses, no guarding or rebound  M/S:   gen. weakness, RUE in immobilizer  NEURO:   Cranial nerves 2-12 are normal tested and grossly at patient's baseline, speech clear  PSYCH:  affect abnormal -flat, anxious at times. occ calls out    Labs:     Most Recent 3 CBC's:  Recent Labs   Lab Test 09/12/17 01/08/17  2330   WBC 10.4 11.9*   HGB 13.2* 14.5   * 99    256     Most Recent 3 BMP's:  Recent Labs   Lab Test 01/08/19 02/27/18 09/28/17    136 138   POTASSIUM 4.6 4.5 4.2   CHLORIDE 103 104 103   CO2 23 23 25   BUN 21 21 31*   CR 1.49* 1.36* 1.84*   ANIONGAP 11 9 10   ELISHA 8.9 8.7 9.1   * 198* 139*       ASSESSMENT/PLAN:  Pain  occ s/s, difficult to assess given memory loss  1. Cont. Prn ultram for now  2. Cont. Tylenol  3. Cont RUE immobilizer, replace as needed-takes off at times  4. Reassess over next couple days, consider change to MS solutabs if hospice started    Type 2 diabetes mellitus with diabetic nephropathy, without long-term current use of insulin (H)  GLC levels 100s. Recent decreased po intake. Ongoing loose stools at times  1. Discontinue metfromin  2. discontinue GLC  levels  3. Discontinue sched. Imodium, cont. Prn imodium  4. Discontinue sliding scale insulin-has not recently needed    Late onset Alzheimer's disease without behavioral disturbance (H)  Hallucinations, agitatio, restlessness at times. serqouel dose increase earlier this week-has been somewhat effective  1. Cont. Sched, prn seroquel  2. Monitor for ongoing hallucinations  3. Refer to hospice  4. Cont comfort cares per dtr's wishes      Electronically signed by:  RONY Sanchez CNP             Sincerely,        RONY Sanchez CNP

## 2019-11-20 NOTE — PROGRESS NOTES
[place] [facility]  [place] [facility] Record Number:  [id number]  Place of Service where encounter took place:  [facility] ([facility]) [[id number]]  Chief Complaint   Patient presents with     Anxiety       HPI:    Farhad Roa  is a 89 year old (8/28/1923), who is being seen today for an episodic care visit.  HPI information obtained from: facility chart records, facility staff and [facility] chart review. Today's concern is: anxiety. Pain, BPH.  Cont. On hospice cares, s/p fall with R humeral FX.  No longer wearing immobilizer.  Pain gen. Stable per staff.  Cont. On tylenol, prn MS.  Has had increased anxiety, agitation at times.  Today more lethargic.  Cont on seroquel, ativan.  Per staff, agitation, anxiety does not appear r/t pain.  Has dementia.  Has had decrease po intake, no po intake since yesterday am.  Cont. On proscar, flomax for BPH. No recent reports of dysuria      Past Medical and Surgical History reviewed in Epic today.    MEDICATIONS:  Current Outpatient Medications   Medication Sig Dispense Refill     QUEtiapine (SEROQUEL) 25 MG tablet Take 25 mg by mouth 3 times daily And 12.5 mg  Tid prn       ACETAMINOPHEN PO Take 1,000 mg by mouth 4 times daily        bisacodyl (DULCOLAX) 10 MG suppository Place 10 mg rectally daily as needed for constipation       FINASTERIDE PO Take 5 mg by mouth daily       LEVOTHYROXINE SODIUM PO Take 25 mcg by mouth daily        melatonin 5 MG tablet Take 10 mg by mouth At Bedtime       polyethylene glycol (MIRALAX/GLYCOLAX) packet Take 17 g by mouth daily       Psyllium 48.57 % POWD Take 1 teaspoonful by mouth daily       tamsulosin (FLOMAX) 0.4 MG capsule Take 0.4 mg by mouth daily        traMADol (ULTRAM) 50 MG tablet Take 0.5 tablets (25 mg) by mouth 4 times daily as needed for severe pain 100 tablet 5     traZODone (DESYREL) 50 MG tablet Take 25 mg by mouth At Bedtime           REVIEW OF SYSTEMS:  Unobtainable secondary to cognitive impairment.      Objective:  Pulse 80   Resp 14   SpO2 96%   Exam:  GENERAL APPEARANCE:  in no distress, sleepy  ENT:  Mouth and posterior oropharynx normal, moist mucous membranes, [place]  EYES:  EOM, conjunctivae, lids, pupils and irises normal, no drainage  NECK:  No adenopathy,masses or thyromegaly, no carotid bruit  RESP:  respiratory effort and palpation of chest normal, lungs clear to auscultation , no respiratory distress, diminished breath sounds bibasilar  CV:  Palpation and auscultation of heart done , regular rate and rhythm, no murmur, rub, or gallop, no edema  ABDOMEN:  normal bowel sounds, soft, nontender, no hepatosplenomegaly or other masses, no guarding or rebound  M/S:   gen. muscle weakness. bony deformity RUE due to previous fx  NEURO:   CNs gross intact  PSYCH:  affect abnormal flat, no apparent anxiety    Labs:     Most Recent 3 CBC's:  Recent Labs   Lab Test 09/12/17 01/08/17  2330   WBC 10.4 11.9*   HGB 13.2* 14.5   * 99    256     Most Recent 3 BMP's:  Recent Labs   Lab Test 01/08/19 02/27/18 09/28/17    136 138   POTASSIUM 4.6 4.5 4.2   CHLORIDE 103 104 103   CO2 23 23 25   BUN 21 21 31*   CR 1.49* 1.36* 1.84*   ANIONGAP 11 9 10   ELISHA 8.9 8.7 9.1   * 198* 139*       ASSESSMENT/PLAN:  Anxiety  Ongoing increased s/s at times. Memory loss due to dementia. Recent functional decline  1. Cont. Sched. Seroquel. Change prn seroquel to 12.5 mg tid  2. Cont. Prn ativan, haldol  3. Monitor for further increased anxiety, agitation  4. If above s/s responsive to prn seroquel, may increase sched. seroquel dose    Pain  occ increased s/s per staff. R humeral fx. Does not jose. Immobilizer  1. Cont. Tylenol  2. Cont. Prn MS  3. Cont. Freq. Repositioning throughout day  4. For increased s/s pain, may sched. MS dose    Benign prostatic hyperplasia without lower urinary tract symptoms  No recent dysuria. Decrease fluid intake recently.  1. Hold po non-comfort meds  2. Monitor for c/o dysuria  3.  Monitor fluid intake  4. Cont. Comfort cares, hospice      Electronically signed by:  RONY Sanchez CNP

## 2019-11-20 NOTE — LETTER
11/20/2019        RE: Farhad Roa  [facility]  [address] [address] [address]        [place] [facility]  [place] [facility] Record Number:  [id number]  Place of Service where encounter took place:  [facility] [place] ([facility] [[id number]]  Chief Complaint   Patient presents with     Anxiety       HPI:    Farhad Roa  is a 89 year old (8/28/1923), who is being seen today for an episodic care visit.  HPI information obtained from: facility chart records, facility staff and [facility] chart review. Today's concern is: anxiety. Pain, BPH.  Cont. On hospice cares, s/p fall with R humeral FX.  No longer wearing immobilizer.  Pain gen. Stable per staff.  Cont. On tylenol, prn MS.  Has had increased anxiety, agitation at times.  Today more lethargic.  Cont on seroquel, ativan.  Per staff, agitation, anxiety does not appear r/t pain.  Has dementia.  Has had decrease po intake, no po intake since yesterday am.  Cont. On proscar, flomax for BPH. No recent reports of dysuria      Past Medical and Surgical History reviewed in Epic today.    MEDICATIONS:  Current Outpatient Medications   Medication Sig Dispense Refill     QUEtiapine (SEROQUEL) 25 MG tablet Take 25 mg by mouth 3 times daily And 12.5 mg  Tid prn       ACETAMINOPHEN PO Take 1,000 mg by mouth 4 times daily        bisacodyl (DULCOLAX) 10 MG suppository Place 10 mg rectally daily as needed for constipation       FINASTERIDE PO Take 5 mg by mouth daily       LEVOTHYROXINE SODIUM PO Take 25 mcg by mouth daily        melatonin 5 MG tablet Take 10 mg by mouth At Bedtime       polyethylene glycol (MIRALAX/GLYCOLAX) packet Take 17 g by mouth daily       Psyllium 48.57 % POWD Take 1 teaspoonful by mouth daily       tamsulosin (FLOMAX) 0.4 MG capsule Take 0.4 mg by mouth daily        traMADol (ULTRAM) 50 MG tablet Take 0.5 tablets (25 mg) by mouth 4 times daily as needed for severe pain 100 tablet 5     traZODone (DESYREL) 50 MG  tablet Take 25 mg by mouth At Bedtime           REVIEW OF SYSTEMS:  Unobtainable secondary to cognitive impairment.     Objective:  Pulse 80   Resp 14   SpO2 96%   Exam:  GENERAL APPEARANCE:  in no distress, sleepy  ENT:  Mouth and posterior oropharynx normal, moist mucous membranes, [place]  EYES:  EOM, conjunctivae, lids, pupils and irises normal, no drainage  NECK:  No adenopathy,masses or thyromegaly, no carotid bruit  RESP:  respiratory effort and palpation of chest normal, lungs clear to auscultation , no respiratory distress, diminished breath sounds bibasilar  CV:  Palpation and auscultation of heart done , regular rate and rhythm, no murmur, rub, or gallop, no edema  ABDOMEN:  normal bowel sounds, soft, nontender, no hepatosplenomegaly or other masses, no guarding or rebound  M/S:   gen. muscle weakness. bony deformity RUE due to previous fx  NEURO:   CNs gross intact  PSYCH:  affect abnormal flat, no apparent anxiety    Labs:     Most Recent 3 CBC's:  Recent Labs   Lab Test 09/12/17 01/08/17  2330   WBC 10.4 11.9*   HGB 13.2* 14.5   * 99    256     Most Recent 3 BMP's:  Recent Labs   Lab Test 01/08/19 02/27/18 09/28/17    136 138   POTASSIUM 4.6 4.5 4.2   CHLORIDE 103 104 103   CO2 23 23 25   BUN 21 21 31*   CR 1.49* 1.36* 1.84*   ANIONGAP 11 9 10   ELISHA 8.9 8.7 9.1   * 198* 139*       ASSESSMENT/PLAN:  Anxiety  Ongoing increased s/s at times. Memory loss due to dementia. Recent functional decline  1. Cont. Sched. Seroquel. Change prn seroquel to 12.5 mg tid  2. Cont. Prn ativan, haldol  3. Monitor for further increased anxiety, agitation  4. If above s/s responsive to prn seroquel, may increase sched. seroquel dose    Pain  occ increased s/s per staff. R humeral fx. Does not jose. Immobilizer  1. Cont. Tylenol  2. Cont. Prn MS  3. Cont. Freq. Repositioning throughout day  4. For increased s/s pain, may sched. MS dose    Benign prostatic hyperplasia without lower urinary tract  symptoms  No recent dysuria. Decrease fluid intake recently.  1. Hold po non-comfort meds  2. Monitor for c/o dysuria  3. Monitor fluid intake  4. Cont. Comfort cares, hospice      Electronically signed by:  RONY Sanchez CNP             Sincerely,        RONY Sanchez CNP

## 2020-01-01 ENCOUNTER — ASSISTED LIVING VISIT (OUTPATIENT)
Dept: GERIATRICS | Facility: CLINIC | Age: 85
End: 2020-01-01
Payer: MEDICARE

## 2020-01-01 VITALS
RESPIRATION RATE: 16 BRPM | HEART RATE: 72 BPM | DIASTOLIC BLOOD PRESSURE: 65 MMHG | OXYGEN SATURATION: 91 % | SYSTOLIC BLOOD PRESSURE: 118 MMHG

## 2020-01-01 VITALS — RESPIRATION RATE: 20 BRPM | DIASTOLIC BLOOD PRESSURE: 66 MMHG | HEART RATE: 79 BPM | SYSTOLIC BLOOD PRESSURE: 109 MMHG

## 2020-01-01 DIAGNOSIS — R33.9 URINARY RETENTION: ICD-10-CM

## 2020-01-01 DIAGNOSIS — G30.1 LATE ONSET ALZHEIMER'S DISEASE WITHOUT BEHAVIORAL DISTURBANCE (H): ICD-10-CM

## 2020-01-01 DIAGNOSIS — F02.80 LATE ONSET ALZHEIMER'S DISEASE WITHOUT BEHAVIORAL DISTURBANCE (H): ICD-10-CM

## 2020-01-01 DIAGNOSIS — M25.551 HIP PAIN, RIGHT: Primary | ICD-10-CM

## 2020-01-01 DIAGNOSIS — F41.9 ANXIETY: ICD-10-CM

## 2020-01-01 RX ORDER — TRAMADOL HYDROCHLORIDE 50 MG/1
25 TABLET ORAL 3 TIMES DAILY
COMMUNITY

## 2020-01-01 RX ORDER — HALOPERIDOL 0.5 MG/1
0.5 TABLET ORAL EVERY 4 HOURS PRN
COMMUNITY

## 2020-01-01 RX ORDER — MORPHINE SULFATE 30 MG/1
5 TABLET ORAL
COMMUNITY

## 2020-01-01 RX ORDER — LORAZEPAM 0.5 MG/1
0.5 TABLET ORAL EVERY 4 HOURS PRN
COMMUNITY

## 2020-01-22 NOTE — LETTER
1/22/2020        RE: Farhad Roa  [facility]  [address] [address] [address]        [place] [facility]  [place] [facility] Record Number:  [id number]  Place of Service where encounter took place:  [facility] [place] [facility] [[id number]]  Chief Complaint   Patient presents with     Musculoskeletal Problem       HPI:    Farhad Roa  is a 89 year old (8/28/1923), who is being seen today for an episodic care visit.  HPI information obtained from: facility chart records, facility staff, patient report and [facility] chart review. Today's concern is: R hip pain, urinary retention, alzheimer's.  Cont. On hospice cares.  Has newer onset of R hip pain. Prn MS not fully effective.  Has decreased mobility. No longer amb. Sharita lift for transfers.  No recent falls. No bruising of R hip.  Has had urinary retention. Started with [place] cath. Recently changed due to obstruction.  Current [place] patent, no recent hematuria.  Cont. On flomax. Has memory loss due to alzheimer's.  Cont. On seroquel.  Mood gen. Stable.       Past Medical and Surgical History reviewed in Epic today.    MEDICATIONS:  Current Outpatient Medications   Medication Sig Dispense Refill     diclofenac (VOLTAREN) 1 % topical gel Place 2 g onto the skin 3 times daily       ACETAMINOPHEN PO Take 1,000 mg by mouth 4 times daily        bisacodyl (DULCOLAX) 10 MG suppository Place 10 mg rectally daily as needed for constipation       FINASTERIDE PO Take 5 mg by mouth daily       LEVOTHYROXINE SODIUM PO Take 25 mcg by mouth daily        melatonin 5 MG tablet Take 10 mg by mouth At Bedtime       polyethylene glycol (MIRALAX/GLYCOLAX) packet Take 17 g by mouth daily       Psyllium 48.57 % POWD Take 1 teaspoonful by mouth daily       QUEtiapine (SEROQUEL) 25 MG tablet Take 25 mg by mouth 3 times daily And 12.5 mg  Tid prn       tamsulosin (FLOMAX) 0.4 MG capsule Take 0.4 mg by mouth daily        traMADol (ULTRAM) 50 MG tablet  Take 0.5 tablets (25 mg) by mouth 4 times daily as needed for severe pain 100 tablet 5     traZODone (DESYREL) 50 MG tablet Take 25 mg by mouth At Bedtime           REVIEW OF SYSTEMS:  Unobtainable secondary to cognitive impairment. Reports some R hip pain today    Objective:  /65   Pulse 72   Resp 16   SpO2 91%   Exam:  GENERAL APPEARANCE:  Alert, in no distress, cooperative  ENT:  Mouth and posterior oropharynx normal, moist mucous membranes, [place]  EYES:  EOM, conjunctivae, lids, pupils and irises normal, PERRL  NECK:  No adenopathy,masses or thyromegaly, no carotid bruit  RESP:  respiratory effort and palpation of chest normal, lungs clear to auscultation , no respiratory distress, diminished breath sounds bibasilar  CV:  Palpation and auscultation of heart done , regular rate and rhythm, no murmur, rub, or gallop, no edema  ABDOMEN:  normal bowel sounds, soft, nontender, no hepatosplenomegaly or other masses, no guarding or rebound  M/S:   gen. weakness, decreased ROM RUE.  some mild pain with PROM RLE  NEURO:   Cranial nerves 2-12 are normal tested and grossly at patient's baseline, alert  PSYCH:  insight and judgement impaired, memory impaired , affect abnormal -flat    Labs:     Most Recent 3 BMP's:  Recent Labs   Lab Test 01/08/19 02/27/18 09/28/17    136 138   POTASSIUM 4.6 4.5 4.2   CHLORIDE 103 104 103   CO2 23 23 25   BUN 21 21 31*   CR 1.49* 1.36* 1.84*   ANIONGAP 11 9 10   ELISHA 8.9 8.7 9.1   * 198* 139*       ASSESSMENT/PLAN:  Hip pain, right  Newer onset. No recent falls  1. Cont. Prn MS  2. Cont. Ibuprofen  3. Start using prn ultram  4. Start voltaren gel to R hip tid    Urinary retention  [place] patent. No c/o bladder pain past couple days  1. Cont. [place] cath  2. Irrigate prn  3. Monitor for hematuria  4. Cont. flomax    Late onset Alzheimer's disease without behavioral disturbance (H)  Memory loss. Mood, behaviors gen. Stable  1. Cont. seroquel  2. Cont. Hs trazodone  3. Monitor  for increased anxiety  4. Cont. Hospice cares      Electronically signed by:  RONY Sanchez CNP               Sincerely,        RONY Sanchez CNP

## 2020-01-22 NOTE — PROGRESS NOTES
[facility]  [place] [facility] Record Number:  [id number]  Place of Service where encounter took place:  [facility] ([facility]) [[id number]]  Chief Complaint   Patient presents with     Musculoskeletal Problem       HPI:    Farhad Roa  is a 89 year old (8/28/1923), who is being seen today for an episodic care visit.  HPI information obtained from: facility chart records, facility staff, patient report and [facility] chart review. Today's concern is: R hip pain, urinary retention, alzheimer's.  Cont. On hospice cares.  Has newer onset of R hip pain. Prn MS not fully effective.  Has decreased mobility. No longer amb. Sharita lift for transfers.  No recent falls. No bruising of R hip.  Has had urinary retention. Started with Alcala cath. Recently changed due to obstruction.  Current Alcala patent, no recent hematuria.  Cont. On flomax. Has memory loss due to alzheimer's.  Cont. On seroquel.  Mood gen. Stable.       Past Medical and Surgical History reviewed in Epic today.    MEDICATIONS:  Current Outpatient Medications   Medication Sig Dispense Refill     diclofenac (VOLTAREN) 1 % topical gel Place 2 g onto the skin 3 times daily       ACETAMINOPHEN PO Take 1,000 mg by mouth 4 times daily        bisacodyl (DULCOLAX) 10 MG suppository Place 10 mg rectally daily as needed for constipation       FINASTERIDE PO Take 5 mg by mouth daily       LEVOTHYROXINE SODIUM PO Take 25 mcg by mouth daily        melatonin 5 MG tablet Take 10 mg by mouth At Bedtime       polyethylene glycol (MIRALAX/GLYCOLAX) packet Take 17 g by mouth daily       Psyllium 48.57 % POWD Take 1 teaspoonful by mouth daily       QUEtiapine (SEROQUEL) 25 MG tablet Take 25 mg by mouth 3 times daily And 12.5 mg  Tid prn       tamsulosin (FLOMAX) 0.4 MG capsule Take 0.4 mg by mouth daily        traMADol (ULTRAM) 50 MG tablet Take 0.5 tablets (25 mg) by mouth 4 times daily as needed for severe pain 100 tablet 5     traZODone (DESYREL) 50  MG tablet Take 25 mg by mouth At Bedtime           REVIEW OF SYSTEMS:  Unobtainable secondary to cognitive impairment. Reports some R hip pain today    Objective:  /65   Pulse 72   Resp 16   SpO2 91%   Exam:  GENERAL APPEARANCE:  Alert, in no distress, cooperative  ENT:  Mouth and posterior oropharynx normal, moist mucous membranes, [place]  EYES:  EOM, conjunctivae, lids, pupils and irises normal, PERRL  NECK:  No adenopathy,masses or thyromegaly, no carotid bruit  RESP:  respiratory effort and palpation of chest normal, lungs clear to auscultation , no respiratory distress, diminished breath sounds bibasilar  CV:  Palpation and auscultation of heart done , regular rate and rhythm, no murmur, rub, or gallop, no edema  ABDOMEN:  normal bowel sounds, soft, nontender, no hepatosplenomegaly or other masses, no guarding or rebound  M/S:   gen. weakness, decreased ROM RUE.  some mild pain with PROM RLE  NEURO:   Cranial nerves 2-12 are normal tested and grossly at patient's baseline, alert  PSYCH:  insight and judgement impaired, memory impaired , affect abnormal -flat    Labs:     Most Recent 3 BMP's:  Recent Labs   Lab Test 01/08/19 02/27/18 09/28/17    136 138   POTASSIUM 4.6 4.5 4.2   CHLORIDE 103 104 103   CO2 23 23 25   BUN 21 21 31*   CR 1.49* 1.36* 1.84*   ANIONGAP 11 9 10   ELISHA 8.9 8.7 9.1   * 198* 139*       ASSESSMENT/PLAN:  Hip pain, right  Newer onset. No recent falls  1. Cont. Prn MS  2. Cont. Ibuprofen  3. Start using prn ultram  4. Start voltaren gel to R hip tid    Urinary retention  Alcala patent. No c/o bladder pain past couple days  1. Cont. Alcala cath  2. Irrigate prn  3. Monitor for hematuria  4. Cont. flomax    Late onset Alzheimer's disease without behavioral disturbance (H)  Memory loss. Mood, behaviors gen. Stable  1. Cont. seroquel  2. Cont. Hs trazodone  3. Monitor for increased anxiety  4. Cont. Hospice cares      Electronically signed by:  Tay Mccormack, RONY CNP

## 2020-01-29 NOTE — LETTER
1/29/2020        RE: Farhad Roa  [facility]  [address] [facility] [id number]        [facility]  [place] [facility] Record Number:  [id number]  Place of Service where encounter took place:  [facility] ([facility] [[id number]]  Chief Complaint   Patient presents with     Pain       HPI:    Farhad Roa  is a 89 year old (8/28/1923), who is being seen today for an episodic care visit.  HPI information obtained from: facility chart records, facility staff, patient report, [facility] chart review and family/first contact dtr report. Today's concern is: pain, anxiety, urinary retention.  Has had acute onset of R hip pain over past week.  Prn MS not always effective, nor is the voltaren gel. Earlier this week started on tid ultram which has been more effective.  Per staff, decreased c/o pain yesterday, decreased anxiety, agitation.  Cont. On seroquel, prn haldol, prn ativan. Had recent urinary retention with bladder pain.  Alcala cath placed. Previous cath clot, replaced, started with 2x/week catheter irrigation.       Past Medical and Surgical History reviewed in Epic today.    MEDICATIONS:  Current Outpatient Medications   Medication Sig Dispense Refill     ACETAMINOPHEN PO Take 1,000 mg by mouth 3 times daily        bisacodyl (DULCOLAX) 10 MG suppository Place 10 mg rectally daily as needed for constipation       diclofenac (VOLTAREN) 1 % topical gel Place 2 g onto the skin 3 times daily AND every day PRN       FINASTERIDE PO Take 5 mg by mouth daily       haloperidol (HALDOL) 0.5 MG tablet Take 0.5 mg by mouth every 4 hours as needed for agitation       hyoscyamine (LEVSIN/SL) 0.125 MG sublingual tablet Place 0.125 mg under the tongue every 4 hours as needed for cramping       LEVOTHYROXINE SODIUM PO Take 25 mcg by mouth daily        LORazepam (ATIVAN) 0.5 MG tablet Take 0.5 mg by mouth every 4 hours as needed for anxiety       melatonin 5 MG tablet Take 10 mg  DBT [employer] Skills Group      DBT (Dialectic Behavior Therapy) is a cognitive behavioral therapy that uses didactics, modeling, behavior rehearsal, and homework exercises to allow the patient and their family member the opportunity to acquire and practice new behavioral skills.     Date: 1/7/20     Group Length: 120 minutes (Start Time: 4:00pm, End Time: 6:00pm)     Number of Participants: 4     Group Facilitators: Sandra Diaz MA; Magdalene Gerber, PhD, LP     Client: Delaney Tran     Family Members Present:  María (Mother)     Diagnosis:  Persistent depressive disorder, current major depressive episode     LETICIA (generalized anxiety disorder), with panic attacks    Homework Reviewed: Interpersonal Effectiveness - DEAR MAN (IE handout 6)     Group Topic for Today: Interpersonal Effectiveness - FAST skills and worry thought/wise mind statements (IE handouts 7 and 8)     Homework Assigned: Interpersonal Effectiveness - FAST skills (IE handout 9)     Subjective Observation: The patient was on time to the group but did not complete her skills group homework; she was amenable to in the moment homework review using a hypothetical example to practice DEAR MAN during homework review. Both the patient and her mother were engaged during the didactic portion of group.      Group Treatment Plan Reviewed: 11/12/2019     Group Treatment Plan Due for Review: 2/10/2020     by mouth At Bedtime       morphine 5 MG solu-tab Take 5 mg by mouth every hour as needed for shortness of breath / dyspnea or moderate to severe pain       polyethylene glycol (MIRALAX/GLYCOLAX) packet Take 17 g by mouth daily       Psyllium 48.57 % POWD Take 1 teaspoonful by mouth daily       QUEtiapine (SEROQUEL) 25 MG tablet Take 25 mg by mouth 3 times daily And 12.5 mg  Tid prn       tamsulosin (FLOMAX) 0.4 MG capsule Take 0.4 mg by mouth daily        traMADol (ULTRAM) 50 MG tablet Take 25 mg by mouth 3 times daily AND 25 mg PO TID PRN       traZODone (DESYREL) 50 MG tablet Take 25 mg by mouth At Bedtime           REVIEW OF SYSTEMS:  Unobtainable secondary to cognitive impairment.  Reports some R hip pain today    Objective:  /66   Pulse 79   Resp 20   Exam:  GENERAL APPEARANCE:  in no distress, cooperative, sleepy  ENT:  Mouth and posterior oropharynx normal, moist mucous membranes, [place]  EYES:  EOM, conjunctivae, lids, pupils and irises normal, PERRL  NECK:  No adenopathy,masses or thyromegaly, no carotid bruit  RESP:  respiratory effort and palpation of chest normal, lungs clear to auscultation , no respiratory distress, diminished breath sounds bibasilar  CV:  Palpation and auscultation of heart done , regular rate and rhythm, no murmur, rub, or gallop, no edema  ABDOMEN:  normal bowel sounds, soft, nontender, no hepatosplenomegaly or other masses, no guarding or rebound  M/S:   gen. muscle weakness, increased rigidity of LEs  NEURO:   Cranial nerves 2-12 are normal tested and grossly at patient's baseline, speech clear  PSYCH:  affect abnormal -flat, no apparent anxiety    Labs:     Most Recent 3 BMP's:  Recent Labs   Lab Test 01/08/19 02/27/18 09/28/17    136 138   POTASSIUM 4.6 4.5 4.2   CHLORIDE 103 104 103   CO2 23 23 25   BUN 21 21 31*   CR 1.49* 1.36* 1.84*   ANIONGAP 11 9 10   ELISHA 8.9 8.7 9.1   * 198* 139*       ASSESSMENT/PLAN:  Hip pain, right  Better controlled with sched.  Ultram  1. Cont. Sched, prn ultram  2. Cont. Prn MS for breakthrough pain  3. Cont. voltaren gel  4. For further reports of increased pain, may increase sched. Ultram dose  5. Minimize transfers when pain exac. Cont. Hospice cares    Anxiety  Decreased over past 2 days with better pain controll  1. Cont. seroquel  2. Cont. Prn ativan, haldol  3. For further increased anxiety, may consider sched. Ativan dose    Urinary retention  Recent exac. Alcala started  1. Cont. Alcala cath  2. Change irrigation to prn  3. Monitor for further reports of bladder pain  4. For further episodes of catheter obstruction, restart sched. Irrigation       Electronically signed by:  RONY Sanchez CNP             Sincerely,        RONY Sanchez CNP

## 2020-01-29 NOTE — PROGRESS NOTES
[facility]  [place] [facility] Record Number:  [id number]  Place of Service where encounter took place:  [facility] ([facility] [[id number]]  Chief Complaint   Patient presents with     Pain       HPI:    Farhad Roa  is a 89 year old (8/28/1923), who is being seen today for an episodic care visit.  HPI information obtained from: facility chart records, facility staff, patient report, [facility] chart review and family/first contact dtr report. Today's concern is: pain, anxiety, urinary retention.  Has had acute onset of R hip pain over past week.  Prn MS not always effective, nor is the voltaren gel. Earlier this week started on tid ultram which has been more effective.  Per staff, decreased c/o pain yesterday, decreased anxiety, agitation.  Cont. On seroquel, prn haldol, prn ativan. Had recent urinary retention with bladder pain.  Alcala cath placed. Previous cath clot, replaced, started with 2x/week catheter irrigation.       Past Medical and Surgical History reviewed in Epic today.    MEDICATIONS:  Current Outpatient Medications   Medication Sig Dispense Refill     ACETAMINOPHEN PO Take 1,000 mg by mouth 3 times daily        bisacodyl (DULCOLAX) 10 MG suppository Place 10 mg rectally daily as needed for constipation       diclofenac (VOLTAREN) 1 % topical gel Place 2 g onto the skin 3 times daily AND every day PRN       FINASTERIDE PO Take 5 mg by mouth daily       haloperidol (HALDOL) 0.5 MG tablet Take 0.5 mg by mouth every 4 hours as needed for agitation       hyoscyamine (LEVSIN/SL) 0.125 MG sublingual tablet Place 0.125 mg under the tongue every 4 hours as needed for cramping       LEVOTHYROXINE SODIUM PO Take 25 mcg by mouth daily        LORazepam (ATIVAN) 0.5 MG tablet Take 0.5 mg by mouth every 4 hours as needed for anxiety       melatonin 5 MG tablet Take 10 mg by mouth At Bedtime       morphine 5 MG solu-tab Take 5 mg by mouth every hour as needed for shortness of breath /  dyspnea or moderate to severe pain       polyethylene glycol (MIRALAX/GLYCOLAX) packet Take 17 g by mouth daily       Psyllium 48.57 % POWD Take 1 teaspoonful by mouth daily       QUEtiapine (SEROQUEL) 25 MG tablet Take 25 mg by mouth 3 times daily And 12.5 mg  Tid prn       tamsulosin (FLOMAX) 0.4 MG capsule Take 0.4 mg by mouth daily        traMADol (ULTRAM) 50 MG tablet Take 25 mg by mouth 3 times daily AND 25 mg PO TID PRN       traZODone (DESYREL) 50 MG tablet Take 25 mg by mouth At Bedtime           REVIEW OF SYSTEMS:  Unobtainable secondary to cognitive impairment.  Reports some R hip pain today    Objective:  /66   Pulse 79   Resp 20   Exam:  GENERAL APPEARANCE:  in no distress, cooperative, sleepy  ENT:  Mouth and posterior oropharynx normal, moist mucous membranes, [place]  EYES:  EOM, conjunctivae, lids, pupils and irises normal, PERRL  NECK:  No adenopathy,masses or thyromegaly, no carotid bruit  RESP:  respiratory effort and palpation of chest normal, lungs clear to auscultation , no respiratory distress, diminished breath sounds bibasilar  CV:  Palpation and auscultation of heart done , regular rate and rhythm, no murmur, rub, or gallop, no edema  ABDOMEN:  normal bowel sounds, soft, nontender, no hepatosplenomegaly or other masses, no guarding or rebound  M/S:   gen. muscle weakness, increased rigidity of LEs  NEURO:   Cranial nerves 2-12 are normal tested and grossly at patient's baseline, speech clear  PSYCH:  affect abnormal -flat, no apparent anxiety    Labs:     Most Recent 3 BMP's:  Recent Labs   Lab Test 01/08/19 02/27/18 09/28/17    136 138   POTASSIUM 4.6 4.5 4.2   CHLORIDE 103 104 103   CO2 23 23 25   BUN 21 21 31*   CR 1.49* 1.36* 1.84*   ANIONGAP 11 9 10   ELISHA 8.9 8.7 9.1   * 198* 139*       ASSESSMENT/PLAN:  Hip pain, right  Better controlled with sched. Ultram  1. Cont. Sched, prn ultram  2. Cont. Prn MS for breakthrough pain  3. Cont. voltaren gel  4. For further  reports of increased pain, may increase sched. Ultram dose  5. Minimize transfers when pain exac. Cont. Hospice cares    Anxiety  Decreased over past 2 days with better pain controll  1. Cont. seroquel  2. Cont. Prn ativan, haldol  3. For further increased anxiety, may consider sched. Ativan dose    Urinary retention  Recent exac. Alcala started  1. Cont. Alcala cath  2. Change irrigation to prn  3. Monitor for further reports of bladder pain  4. For further episodes of catheter obstruction, restart sched. Irrigation       Electronically signed by:  RONY Sanchez CNP

## 2020-10-13 NOTE — PROGRESS NOTES
[facility]  [place] [facility] Record Number:  [id number]  Place of Service where encounter took place:  [facility] [[id number]]  Chief Complaint   Patient presents with     Pain       HPI:    Farhad Roa  is a 89 year old (8/28/1923), who is being seen today for an episodic care visit.  HPI information obtained from: facility chart records, facility staff, patient report and [facility] chart review. Today's concern is: pain, DM, diarrhea.  S/p fall with fx R humerus, displaced. Cont. With RUE Eric brace, RUE sling, immobilizer. RUE edema sig. Improved. Now plastic brace too lose to immobilize shoulder. Has occ increased pain.  Has tylenol, prn ultram. No recent use of prn ultram.  Cont. With PT. Requires w.c. assessment for wider w.c. or lower R arm rest -diff hanging RUE in sling-tends to rest on arm rest.  For DM cont. On sliding scale insulin. Metformin restarted at lower dose since return to [place]. GLC levels stable. Since starting metformin, has had increased diarrhea. Started on prn imodium.      Past Medical and Surgical History reviewed in Epic today.    MEDICATIONS:  Current Outpatient Medications   Medication Sig Dispense Refill     ACETAMINOPHEN PO Take 1,000 mg by mouth 4 times daily        bisacodyl (DULCOLAX) 10 MG suppository Place 10 mg rectally daily as needed for constipation       Cranberry 450 MG CAPS Take by mouth 2 times daily       cyanocobalamin (VITAMIN  B-12) 1000 MCG tablet Take 1,000 mcg by mouth every other day        FINASTERIDE PO Take 5 mg by mouth daily       insulin aspart (NOVOLOG FLEXPEN) 100 UNIT/ML pen Inject Subcutaneous 3 times daily (with meals) PER SLIDING SCALE:  200-249 = 2U  250-299 = 4U  300-349 = 6U  350-399 = 8U  400-450 = 10U  >450 = 12U       LEVOTHYROXINE SODIUM PO Take 25 mcg by mouth daily        loperamide (IMODIUM) 2 MG capsule Take 2 mg by mouth every morning And tid prn       melatonin 5 MG tablet Take 10 mg by  In 5-7 days  Please Follow-up with:     [facility]: [address] [place] [address] Phone: [phone]    Home, take over-the-counter chelated zinc 2 or 3 times daily for the next 2 weeks, take over-the-counter vitamin D 2000 to 5000 international units once daily, Pepcid 20 mg twice daily for 14 days, amoxicillin 875 mg twice daily for 10 days, refer to [facility] for follow-up or go to the emergency department for problems including new or worsening symptoms especially for chest pain or shortness of breath.  You should quarantine for 10 days minimum and until free of symptoms including fever for 3 consecutive days    Patient Education     Coronavirus Disease 2019 (COVID-19): Overview  Coronavirus disease 2019 (COVID-19) is a respiratory illness. It's caused by a new (novel) coronavirus. There are many types of coronavirus. Coronaviruses are a very common cause of colds and bronchitis. They may sometimes cause lung infection (pneumonia). Symptoms can range from mild to severe. Some people have no symptoms. These viruses are also found in some animals.   All 50 states in the [place] have reported cases of COVID-19. Most states report \"community spread\" of COVID-19. This means the source of the illness is not known. COVID-19 is a rapidly-emerging infectious disease. This means that scientists are actively researching it. There are information updates regularly.   Public health officials are working to find the source. How the virus spreads is not yet fully understood, but it seems to spread and infect people fairly easily. Some people who have been infected in an area may not be sure how or where they were infected. The virus may be spread through droplets of fluid that a person coughs or sneezes into the air. It may be spread if you touch a surface with the virus on it, such as a handle or object, and then touch your eyes, nose, or mouth.   For the latest information, visit the CDC  "mouth At Bedtime       metFORMIN (GLUCOPHAGE) 500 MG tablet Take 500 mg by mouth 2 times daily (with meals)       polyethylene glycol (MIRALAX/GLYCOLAX) packet Take 17 g by mouth daily       Psyllium 48.57 % POWD Take 1 teaspoonful by mouth daily       QUEtiapine (SEROQUEL) 25 MG tablet Take 12.5 mg by mouth 2 times daily AND 12.5 MG every day PRN        senna-docusate (SENOKOT-S/PERICOLACE) 8.6-50 MG tablet Take 1 tablet by mouth 2 times daily       sodium polystyrene (KAYEXALATE) 15 GM/60ML suspension Take 15 g by mouth daily Avoid taking other oral medications 3 hours before or after this medication.       tamsulosin (FLOMAX) 0.4 MG capsule Take 0.4 mg by mouth daily        traMADol (ULTRAM) 50 MG tablet Take 0.5 tablets (25 mg) by mouth 2 times daily as needed for severe pain 45 tablet 5     traZODone (DESYREL) 50 MG tablet Take 25 mg by mouth At Bedtime           REVIEW OF SYSTEMS:  Unobtainable secondary to cognitive impairment. Reports feeling ok today, ongoing diarrhea    Objective:  /67   Pulse 79   Resp 18   Ht 1.778 m (5' 10\")   Wt 78.5 kg (173 lb)   SpO2 92%   BMI 24.82 kg/m    Exam:  GENERAL APPEARANCE:  Alert, in no distress, cooperative  ENT:  Mouth and posterior oropharynx normal, moist mucous membranes, [place]  EYES:  EOM, conjunctivae, lids, pupils and irises normal, PERRL  NECK:  No adenopathy,masses or thyromegaly, FROM  RESP:  respiratory effort and palpation of chest normal, lungs clear to auscultation , no respiratory distress  CV:  Palpation and auscultation of heart done , regular rate and rhythm, no murmur, rub, or gallop, peripheral edema trace-1+ in RUE  ABDOMEN:  normal bowel sounds, soft, nontender, no hepatosplenomegaly or other masses, no guarding or rebound  M/S:   muscle strength 5/5 LUE, LEs with gen LE weakness. decreased ROM RUE-immobilizer  NEURO:   Cranial nerves 2-12 are normal tested and grossly at patient's baseline, alert, speech clear  PSYCH:  insight and " website at www.cdc.gov/coronavirus/2019-ncov. Or call 049-UXC-XORU (873-585-7521).   What are the symptoms of COVID-19?  Some people have no symptoms or mild symptoms. Symptoms can also vary from person to person. As experts learn more about COVID-19, other symptoms are being reported. Symptoms may appear 2 to 14 days after contact with the virus:   · Fever  · Coughing  · Trouble breathing or feeling short of breath  · Sore throat  · Runny nose  · Headache and body aches  · Chills or repeated shaking with chills  · Fatigue  · Loss of appetite  · Nausea, vomiting, diarrhea, or abdominal pain  · Loss of sense of smell and taste  You can check your symptoms with the CDC’s Coronavirus Self-.   What are possible complications from COVID-19?  In many cases, this virus can cause infection (pneumonia) in both lungs. In some cases, this can cause death. Certain people are at higher risk for complications. This includes older adults and people with serious chronic health conditions such as heart or lung disease, diabetes, or kidney disease. It includes people with health conditions that suppress the immune system. And it includes people taking medicines that suppress the immune system.   As experts learn more about COVID-19, other complications are being reported that may be linked to COVID-19. Rarely, some children have developed severe complications called multisystem inflammatory syndrome in children (MIS-C). MIS-C seems to be similar to [place] disease, a rare condition causing inflammation of blood vessels and body organs. It's not yet known if MIS-C happens only in children, or if adults are also at risk. It's also not known if it's related to COVID-19, because many children, but not all, have tested positive for the virus. Experts continue to study MIS-C. The CDC advises healthcare providers to report to local health departments any person under age 21 years old who is ill enough to be in the hospital and has  all of the following:   · A fever over 100.4°F (38.0°C) for more than 24 hours and a positive SARS-CoV-2 test or exposure to the virus in the last 4 weeks  · Inflammation in at least 2 organs such as the heart, lungs, or kidneys with lab tests that show inflammation  · No other diagnoses besides COVID-19 explain the child's symptoms  How is COVID-19 diagnosed?  Your healthcare provider will ask about your symptoms. He or she will ask where you live, and about your recent travel, and any contact with sick people. If your healthcare provider thinks you may have COVID-19, he or she will consider whether to test you for COVID-19. This depends on the availability of testing in your area, and how sick you are. Follow all instructions from your healthcare provider. Guidelines for testing may change as more information about the virus becomes available. Currently, COVID-19 is diagnosed by:   · Nose-throat swab.  A swab is wiped inside your nose to the back of your throat. This is a viral test to tell you if you have a current COVID-19 infection.  If your healthcare provider thinks or confirms that you have COVID-19, you may have other tests. These tests may include:   · Antibody blood test.  Antibody tests are being looked at to find out if a person has previously been infected with the virus and may now have antibodies such as SARS AB IgG in their blood to give some immunity. The accuracy and availability of antibody tests vary. An antibody test may not be able to show if you have a current infection because it can take up to a few weeks after infection to make antibodies. It's not yet known how long immunity lasts after being infected with the virus.  · Sputum culture.  A small sample of mucus coughed from your lungs (sputum) may be collected if you have a moist cough. It may be checked for the virus or to look for pneumonia.  · Imaging tests.  You may have a chest X-ray or CT scan.  How is COVID-19 treated?  There is  judgement impaired, memory impaired , affect and mood normal    Labs:     Most Recent 3 CBC's:  Recent Labs   Lab Test 09/12/17 01/08/17  2330   WBC 10.4 11.9*   HGB 13.2* 14.5   * 99    256     Most Recent 3 BMP's:  Recent Labs   Lab Test 01/08/19 02/27/18 09/28/17    136 138   POTASSIUM 4.6 4.5 4.2   CHLORIDE 103 104 103   CO2 23 23 25   BUN 21 21 31*   CR 1.49* 1.36* 1.84*   ANIONGAP 11 9 10   ELISHA 8.9 8.7 9.1   * 198* 139*       ASSESSMENT/PLAN:  Pain  S/p R humeral fx  - traMADol (ULTRAM) 50 MG tablet; Take 0.5 tablets (25 mg) by mouth 2 times daily as needed for severe pain  2. Cont. Tylenol  3. PT for LE weakness. Refer to OT for w.c., requires lower R arm rest  4. Amb. As jose  5. discontinue RUE plastic brace, cont. Immobilizer, XRs next month per [facility]    Type 2 diabetes mellitus with diabetic nephropathy, without long-term current use of insulin (H)  GLC levels stable no recent sliding scale insulin given per scale  1. Cont. Metformin  2. Cont. Tid GLC levels  3. Reassess GLC levels over next week, if stable, discontinue sliding scale insulin, change GLC check freq. To qd    Diarrhea, unspecified type  Ongoing s/s, likely r/t metformin  1. Cont. Prn imodium  2. Start qam imodium  3. Reassess over next week, for further ongoing s/s, consider discontinue of metformin, restart glipizide if po intake stable      Electronically signed by:  RONY Sanchez CNP         Documentation of Face-to-Face and Certification for Home Health Services     Patient: Farhad Roa   YOB: 1923  MR Number: [id number]  Today's Date: 10/9/2019    I certify that patient: Farhad Roa is under my care and that I, or a nurse practitioner or physician's assistant working with me, had a face-to-face encounter that meets the physician face-to-face encounter requirements with this patient on: 10/9/2019.    This encounter with the patient was in whole, or in part, for  currently no medicine proven to prevent or treat the virus. Some experimental medicines are being tested for COVID-19. Other medicines used to treat other conditions are being looked at for COVID-19, but these are not currently approved to treat it.   The most proven treatments right now are those to help your body while it fights the virus. This is known as supportive care. Supportive care may include:   · Getting rest.  This helps your body fight the illness.  · Staying hydrated.  Drinking liquids is the best way to prevent dehydration.. Try to drink 6 to 8 glasses of liquids every day, or as advised by your provider. Also check with your provider about which fluids are best for you. Don't drink fluids that contain caffeine or alcohol.  · Taking over-the-counter (OTC) pain medicine.  These are used to help ease pain and reduce fever. Follow your healthcare provider's instructions for which OTC medicine to use.  For severe illness, you may need to stay in the hospital. Care during severe illness may include:   · IV (intravenous) fluids.  These are given through a vein to help keep your body hydrated.  · Oxygen. You may be given supplemental oxygen or ventilation with a breathing machine (ventilator). This is done so you get enough oxygen in your body.  · Prone positioning.  Depending on how sick you are during your hospital stay, your healthcare team may turn you regularly on your stomach. This is called prone positioning. It helps increase the amount of oxygen you get to your lungs. Follow your healthcare team's instructions on position changes while you're in the hospital. Also follow their discharge advice on the best positions to help your breathing once you go home.  People who have had COVID-19 and are fully recovered may be asked by their healthcare team to consider donating plasma. This is called COVID-19 convalescent plasma donation. Plasma from people fully recovered from COVID-19 may contain antibodies  the following medical condition, which is the primary reason for home health care: LE weakness.    I certify that, based on my findings, the following services are medically necessary home health services: Occupational Therapy and Physical Therapy.    My clinical findings support the need for the above services because: Occupational Therapy Services are needed to assess and treat cognitive ability and address ADL safety due to impairment in wheelchair assessment. and Physical Therapy Services are needed to assess and treat the following functional impairments: LE weakness.    Further, I certify that my clinical findings support that this patient is homebound (i.e. absences from home require considerable and taxing effort and are for medical reasons or [employer] services or infrequently or of short duration when for other reasons) because: Requires assistance of another person or specialized equipment to access medical services because patient: Range of motion limitations prevents ability to exit home safely. and  memory loss..    Based on the above findings. I certify that this patient is confined to the home and needs intermittent skilled nursing care, physical therapy and/or speech therapy.  The patient is under my care, and I have initiated the establishment of the plan of care.  This patient will be followed by a physician who will periodically review the plan of care.  Physician/Provider to provide follow up care: Tay Mccormack    Responsible Medicare certified PECOS Physician: Heidy Melton  Physician Signature: See electronic signature associated with these discharge orders.  Date: 10/9/2019          Face to Face and Medical Necessity Statement for DME Provider visit    Demographic Information on Farhad Roa:  Gender: male  : 1923  [facility]  [address] [place] [id number]  [phone] (home)     Medical Record: [id number]  Social Security Number:  to help fight COVID-19 in people who are currently seriously ill with the disease. It's not fully known if the donated plasma will work well as a treatment, but the FDA is looking at it and has asked the [employer] to help with plasma donation and collection. Talk with your provider to learn more about convalescent plasma donation and whether you qualify to donate.   Are you at risk for COVID-19?  You are at risk for COVID-19 if you have had close contact with someone with the virus, or if you live in or traveled to an area with cases of it. Close contact means being within about 6 feet of someone, or living in the same house or visiting a person who has or may have COVID-19. Some recent studies suggest that COVID-19 may be spread by people who are not showing symptoms.   Date last modified: 5/20/2020  StayWell last reviewed this educational content on 1/1/2020  © 4677-3999 [employer]. [address] [place] [address], [place] [address]. All rights reserved. This information is not intended as a substitute for professional medical care. Always follow your healthcare professional's instructions.            [phone]  Primary Care Provider: Tay Mccormack  Insurance: Payor: MEDICARE / Plan: MEDICARE / Product Type: Medicare /     HPI:   Farhad Roa is a 89 year old  (8/28/1923), who is being seen today for a face to face provider visit at [facility]; medical necessity statement for DME included. This patient requires the following:  DME Ordered and Medical Necessity Statement     Wheelchair Documentation  Size: 18 x 16  Corresponding cushion: Yes: pressure relieving  Standard foot rests: Yes  Elevating leg rests: No  Arm rests: Yes: low R arm rest  Lap tray: No  Dose the patient use oxygen? No   Is the patient able to propel wheelchair? Yes If no why not? Immobilizer to RUE And is there someone who can?yes, staff  1. The patient has mobility limitations that impairs their ability to participate in one or more mobility related activities: Toileting, Feeding, Grooming and Bathing.  The wheelchair is suitable and necessary for use in the patient's home.  2. The patient's mobility limitations cannot be safely resolved by using a cane/walker:Yes    Reason why a cane or walker will not meet the patient's needs. (ie: balance, tolerance, level of assistance) unable to w.b. RUE due to fx, immobilizer  3. The patients home has adequate access to use a manual wheelchair:Yes  4. The use of a manual wheelchair on a regular basis will improve the patients ability to participate in mobility related ADL's at home:Yes  5. The patient is willing to use a manual wheelchair at home:Yes  6. The patient has adequate upper body strength and the mental capability to safely use a manual wheelchair and/or has a caregiver that is able to assist: Yes  7. Does the patient have a lower extremity injury or edema?No  Reason for Type of Wheelchair  Patient weight: 0 lbs 0 oz  Light Weight Wheelchair: Patient is unable to self-propel a standard wheelchair in the home but can self propel a light weight wheelchair.        Pt needing  "above DME with expected length of need of 99 mos due to medical necessity associated with following diagnosis:     Pain  Type 2 diabetes mellitus with diabetic nephropathy, without long-term current use of insulin (H)  Diarrhea, unspecified type      PMH   has a past medical history of Cancer (H), Depressive disorder, Diabetes (H), Hypertension, and Thyroid disease.        EXAM  Vitals: /67   Pulse 79   Resp 18   Ht 1.778 m (5' 10\")   Wt 78.5 kg (173 lb)   SpO2 92%   BMI 24.82 kg/m  ;BMI= Body mass index is 24.82 kg/m .ASSESSMENT/PLAN:  1. Pain    2. Type 2 diabetes mellitus with diabetic nephropathy, without long-term current use of insulin (H)    3. Diarrhea, unspecified type        Orders:  1. Facility staff/TC to contact DME company to get their order form for provider to fill out    ELECTRONICALLY SIGNED BY NARINDER CERTIFIED PROVIDER:  RONY Sanchez CNP   NPI: [id number]  [facility]  [address] [place] [address], [address]  [place] [address]    "